# Patient Record
Sex: MALE | Race: BLACK OR AFRICAN AMERICAN | Employment: FULL TIME | URBAN - METROPOLITAN AREA
[De-identification: names, ages, dates, MRNs, and addresses within clinical notes are randomized per-mention and may not be internally consistent; named-entity substitution may affect disease eponyms.]

---

## 2017-01-03 DIAGNOSIS — Z01.812 BLOOD TESTS PRIOR TO TREATMENT OR PROCEDURE: ICD-10-CM

## 2017-01-03 DIAGNOSIS — S83.242A ACUTE MEDIAL MENISCUS TEAR OF LEFT KNEE, INITIAL ENCOUNTER: Primary | ICD-10-CM

## 2017-01-03 DIAGNOSIS — S83.282A ACUTE LATERAL MENISCUS TEAR OF LEFT KNEE, INITIAL ENCOUNTER: ICD-10-CM

## 2017-01-03 DIAGNOSIS — Z01.810 PRE-OPERATIVE CARDIOVASCULAR EXAMINATION: ICD-10-CM

## 2017-01-03 DIAGNOSIS — Z01.811 PRE-OPERATIVE RESPIRATORY EXAMINATION: ICD-10-CM

## 2017-01-04 ENCOUNTER — DOCUMENTATION ONLY (OUTPATIENT)
Dept: ORTHOPEDIC SURGERY | Facility: CLINIC | Age: 42
End: 2017-01-04

## 2017-01-04 ENCOUNTER — HOSPITAL ENCOUNTER (OUTPATIENT)
Dept: GENERAL RADIOLOGY | Age: 42
Discharge: HOME | End: 2017-01-04
Payer: COMMERCIAL

## 2017-01-04 ENCOUNTER — HOSPITAL ENCOUNTER (OUTPATIENT)
Dept: PREADMISSION TESTING | Age: 42
Discharge: HOME | End: 2017-01-04
Payer: COMMERCIAL

## 2017-01-04 DIAGNOSIS — S83.242A ACUTE MEDIAL MENISCUS TEAR OF LEFT KNEE, INITIAL ENCOUNTER: ICD-10-CM

## 2017-01-04 DIAGNOSIS — Z01.812 BLOOD TESTS PRIOR TO TREATMENT OR PROCEDURE: ICD-10-CM

## 2017-01-04 DIAGNOSIS — Z01.811 PRE-OPERATIVE RESPIRATORY EXAMINATION: ICD-10-CM

## 2017-01-04 DIAGNOSIS — S83.282A ACUTE LATERAL MENISCUS TEAR OF LEFT KNEE, INITIAL ENCOUNTER: ICD-10-CM

## 2017-01-04 DIAGNOSIS — Z01.810 PRE-OPERATIVE CARDIOVASCULAR EXAMINATION: ICD-10-CM

## 2017-01-04 LAB
ANION GAP BLD CALC-SCNC: 7 MMOL/L (ref 3–18)
BASOPHILS # BLD AUTO: 0.1 K/UL (ref 0–0.1)
BASOPHILS # BLD: 1 % (ref 0–2)
BUN SERPL-MCNC: 15 MG/DL (ref 7–18)
BUN/CREAT SERPL: 13 (ref 12–20)
CALCIUM SERPL-MCNC: 9 MG/DL (ref 8.5–10.1)
CHLORIDE SERPL-SCNC: 104 MMOL/L (ref 100–108)
CO2 SERPL-SCNC: 28 MMOL/L (ref 21–32)
CREAT SERPL-MCNC: 1.2 MG/DL (ref 0.6–1.3)
DIFFERENTIAL METHOD BLD: ABNORMAL
EOSINOPHIL # BLD: 0.6 K/UL (ref 0–0.4)
EOSINOPHIL NFR BLD: 8 % (ref 0–5)
ERYTHROCYTE [DISTWIDTH] IN BLOOD BY AUTOMATED COUNT: 14 % (ref 11.6–14.5)
GLUCOSE SERPL-MCNC: 76 MG/DL (ref 74–99)
HCT VFR BLD AUTO: 43.4 % (ref 36–48)
HGB BLD-MCNC: 14.6 G/DL (ref 13–16)
LYMPHOCYTES # BLD AUTO: 33 % (ref 21–52)
LYMPHOCYTES # BLD: 2.5 K/UL (ref 0.9–3.6)
MCH RBC QN AUTO: 26.2 PG (ref 24–34)
MCHC RBC AUTO-ENTMCNC: 33.6 G/DL (ref 31–37)
MCV RBC AUTO: 77.9 FL (ref 74–97)
MONOCYTES # BLD: 0.7 K/UL (ref 0.05–1.2)
MONOCYTES NFR BLD AUTO: 9 % (ref 3–10)
NEUTS SEG # BLD: 4 K/UL (ref 1.8–8)
NEUTS SEG NFR BLD AUTO: 49 % (ref 40–73)
PLATELET # BLD AUTO: 203 K/UL (ref 135–420)
PMV BLD AUTO: 11.5 FL (ref 9.2–11.8)
POTASSIUM SERPL-SCNC: 4.2 MMOL/L (ref 3.5–5.5)
RBC # BLD AUTO: 5.57 M/UL (ref 4.7–5.5)
SODIUM SERPL-SCNC: 139 MMOL/L (ref 136–145)
WBC # BLD AUTO: 7.8 K/UL (ref 4.6–13.2)

## 2017-01-04 PROCEDURE — 80048 BASIC METABOLIC PNL TOTAL CA: CPT | Performed by: PHYSICIAN ASSISTANT

## 2017-01-04 PROCEDURE — 93005 ELECTROCARDIOGRAM TRACING: CPT

## 2017-01-04 PROCEDURE — 71020 XR CHEST PA LAT: CPT

## 2017-01-04 PROCEDURE — 85025 COMPLETE CBC W/AUTO DIFF WBC: CPT | Performed by: PHYSICIAN ASSISTANT

## 2017-01-04 PROCEDURE — 36415 COLL VENOUS BLD VENIPUNCTURE: CPT | Performed by: PHYSICIAN ASSISTANT

## 2017-01-04 NOTE — PROGRESS NOTES
Patient came into the HS office and dropped off disability paperwork to be completed by Dr. Ann Arnett. Patient doesn't want his form faxed back. He will pick it up. Patient contact [phone].   Surgery is scheduled with Dr. Ann Arnett on the 1/11/17

## 2017-01-05 LAB
ATRIAL RATE: 56 BPM
CALCULATED P AXIS, ECG09: 46 DEGREES
CALCULATED R AXIS, ECG10: 37 DEGREES
CALCULATED T AXIS, ECG11: 16 DEGREES
DIAGNOSIS, 93000: NORMAL
P-R INTERVAL, ECG05: 184 MS
Q-T INTERVAL, ECG07: 444 MS
QRS DURATION, ECG06: 110 MS
QTC CALCULATION (BEZET), ECG08: 428 MS
VENTRICULAR RATE, ECG03: 56 BPM

## 2017-01-09 ENCOUNTER — OFFICE VISIT (OUTPATIENT)
Dept: ORTHOPEDIC SURGERY | Facility: CLINIC | Age: 42
End: 2017-01-09

## 2017-01-09 VITALS
BODY MASS INDEX: 35.16 KG/M2 | DIASTOLIC BLOOD PRESSURE: 96 MMHG | RESPIRATION RATE: 16 BRPM | HEART RATE: 110 BPM | WEIGHT: 274 LBS | SYSTOLIC BLOOD PRESSURE: 149 MMHG | HEIGHT: 74 IN | TEMPERATURE: 97.9 F

## 2017-01-09 DIAGNOSIS — S83.242D TEAR OF MEDIAL MENISCUS OF LEFT KNEE, CURRENT, UNSPECIFIED TEAR TYPE, SUBSEQUENT ENCOUNTER: Primary | ICD-10-CM

## 2017-01-09 DIAGNOSIS — Z01.818 PREOP GENERAL PHYSICAL EXAM: ICD-10-CM

## 2017-01-09 RX ORDER — PROMETHAZINE HYDROCHLORIDE 25 MG/1
25 TABLET ORAL
Qty: 20 TAB | Refills: 0 | Status: SHIPPED | OUTPATIENT
Start: 2017-01-09 | End: 2018-02-15

## 2017-01-09 RX ORDER — LOSARTAN POTASSIUM AND HYDROCHLOROTHIAZIDE 12.5; 5 MG/1; MG/1
1 TABLET ORAL DAILY
COMMUNITY
End: 2018-02-15

## 2017-01-09 RX ORDER — METOPROLOL TARTRATE 50 MG/1
TABLET ORAL 2 TIMES DAILY
Status: ON HOLD | COMMUNITY
End: 2018-02-15

## 2017-01-09 RX ORDER — OXYCODONE AND ACETAMINOPHEN 5; 325 MG/1; MG/1
1 TABLET ORAL
Qty: 64 TAB | Refills: 0 | Status: SHIPPED | OUTPATIENT
Start: 2017-01-09 | End: 2018-02-15

## 2017-01-09 NOTE — H&P
History and Physical    35 year AA male seen for H and P in preparation for left knee arthroscopy  Review of Systems   Constitutional: Positive for activity change (decreased use left knee secondary to internal derangement). Negative for chills, fatigue and fever. HENT: Negative for ear pain. Eyes: Negative for pain. Respiratory: Negative for shortness of breath and wheezing. Cardiovascular: Negative. Gastrointestinal: Negative for nausea and vomiting. Endocrine: Negative. Genitourinary: Negative for flank pain. Musculoskeletal: Positive for gait problem, joint swelling and myalgias. Left knee   Skin: Negative. Allergic/Immunologic: Negative. Neurological: Positive for weakness (Left Lower extremity). Hematological: Negative. Psychiatric/Behavioral: Negative. Physical Exam   Nursing note and vitals reviewed. Constitutional: He is oriented to person, place, and time. He appears well-developed and well-nourished. HENT:   Head: Normocephalic and atraumatic. Right Ear: External ear normal.   Left Ear: External ear normal.   Nose: Nose normal.   Mouth/Throat: Oropharynx is clear and moist.   Eyes: Conjunctivae and EOM are normal. Pupils are equal, round, and reactive to light. Neck: Normal range of motion. Cardiovascular: Normal rate and regular rhythm. Murmur heard. Systolic murmur is present with a grade of 4/6   Pulmonary/Chest: Effort normal and breath sounds normal.   Musculoskeletal:        Left knee: He exhibits decreased range of motion and effusion. Tenderness found. Medial joint line tenderness noted. Legs:  Neurological: He is alert and oriented to person, place, and time. Skin: Skin is warm and dry. Psychiatric: He has a normal mood and affect.  His behavior is normal. Judgment and thought content normal.     Left knee internal derangement    Pain in left knee secondary to above    Left knee arthroscopy      Risk / Benefit: Surgeon will discuss in \"face to face\" encounter on day of surgery.

## 2017-01-09 NOTE — PROGRESS NOTES
Alejandra Perry returns for History and Physical in preparation of upcoming left knee arthroscopy. He has a history of left knee internal derangement and has failed all conservative measures directed under the care of Dr. Paulo Mendez. Please see the attached forms in Epic for History, Physical, and Review of systems.

## 2017-01-10 ENCOUNTER — DOCUMENTATION ONLY (OUTPATIENT)
Dept: ORTHOPEDIC SURGERY | Facility: CLINIC | Age: 42
End: 2017-01-10

## 2017-01-13 ENCOUNTER — OFFICE VISIT (OUTPATIENT)
Dept: ORTHOPEDIC SURGERY | Facility: CLINIC | Age: 42
End: 2017-01-13

## 2017-01-13 VITALS
WEIGHT: 274 LBS | DIASTOLIC BLOOD PRESSURE: 102 MMHG | SYSTOLIC BLOOD PRESSURE: 154 MMHG | TEMPERATURE: 98 F | BODY MASS INDEX: 35.18 KG/M2 | HEART RATE: 69 BPM

## 2017-01-13 DIAGNOSIS — S83.242D TEAR OF MEDIAL MENISCUS OF LEFT KNEE, CURRENT, UNSPECIFIED TEAR TYPE, SUBSEQUENT ENCOUNTER: Primary | ICD-10-CM

## 2017-01-13 DIAGNOSIS — M25.40 EFFUSION INTO JOINT: ICD-10-CM

## 2017-01-13 RX ORDER — TRIAMCINOLONE ACETONIDE 40 MG/ML
40 INJECTION, SUSPENSION INTRA-ARTICULAR; INTRAMUSCULAR ONCE
Qty: 1 ML | Refills: 0
Start: 2017-01-13 | End: 2017-01-13

## 2017-01-13 RX ORDER — OXYCODONE HYDROCHLORIDE 10 MG/1
10 TABLET ORAL
Qty: 44 TAB | Refills: 0 | Status: SHIPPED | OUTPATIENT
Start: 2017-01-13 | End: 2018-02-15

## 2017-01-13 NOTE — PROGRESS NOTES
HISTORY OF PRESENT ILLNESS:  Adia Gary returns two days status post his left knee arthroscopy with partial medial meniscectomy. He has pain in the left knee today that he rates as an 8/10. He is using crutches for ambulation assistance. He is partial weightbearing of his left lower extremity. Intraoperatively, he was found to have grade one to two changes, chondromalacia of the patella, and grade three to four degenerative changes in the medial femoral condyle and tibial articular surface. PHYSICAL EXAM:  The patient lying supine reveals a 2+ effusion of the left knee, anterior medial and lateral joint portals are intact with interrupted nylon sutures. There is no evidence of wound dehiscence or infection noted. He has minimal range of motion of his left knee today at only 80° - 20°. There is popliteus tenderness but no calf tenderness. PROCEDURE:  Using sterile technique, after verbal and written consent were obtained, 5 cc of 1% Lidocaine was used to anesthetize the left knee using a superior articular approach. There are no complications. To follow, 65 cc of thin, bloody aspirate were removed from the same portal to follow 12 cc of Marcaine 0.25% mixed with 2 ml of Kenalog at 40 mg per ml with no complications. PLAN:  The patient is going to wean himself from his crutches over the next four days. He will follow with our office in a week for wound check and likely suture removal.  His medicine for pain was increased to 10 mg, and he may take one and one-half tablets if necessary for symptom management every six hours.

## 2017-01-13 NOTE — MR AVS SNAPSHOT
Visit Information Date & Time Provider Department Dept. Phone Encounter #  
 1/13/2017  8:45 AM Lauren Herr, [address] [facility] [phone] [id number] Follow-up Instructions Return in about 1 week (around 1/20/2017). Upcoming Health Maintenance Date Due DTaP/Tdap/Td series (1 - Tdap) 9/4/1994 INFLUENZA AGE 9 TO ADULT 8/1/2016 Allergies as of 1/13/2017  Review Complete On: 1/13/2017 By: Lauren Herr PA-C No Known Allergies Current Immunizations  Never Reviewed No immunizations on file. Not reviewed this visit You Were Diagnosed With   
  
 Codes Comments Tear of medial meniscus of left knee, current, unspecified tear type, subsequent encounter    -  Primary ICD-10-CM: L05.143O ICD-9-CM: 836.0 Effusion into joint     ICD-10-CM: M25.40 ICD-9-CM: 719.00 Vitals BP Pulse Temp Weight(growth percentile) BMI Smoking Status (!) 154/102 (BP 1 Location: Left arm, BP Patient Position: Sitting) 69 98 °F (36.7 °C) (Oral) 274 lb (124.3 kg) 35.18 kg/m2 Never Smoker BMI and BSA Data Body Mass Index Body Surface Area  
 35.18 kg/m 2 2.55 m 2 Your Updated Medication List  
  
   
This list is accurate as of: 1/13/17  9:10 AM.  Always use your most recent med list.  
  
  
  
  
 acetaminophen-codeine 300-30 mg per tablet Commonly known as:  TYLENOL-CODEINE #3  
1-2 tabs Qhs prn pain  
  
 albuterol 90 mcg/actuation inhaler Commonly known as:  PROVENTIL HFA, VENTOLIN HFA, PROAIR HFA Take 2 Puffs by inhalation every four (4) hours as needed for Wheezing or Shortness of Breath (or cough). lisinopril 10 mg tablet Commonly known as:  Torre [place] Take  by mouth daily. losartan-hydroCHLOROthiazide 50-12.5 mg per tablet Commonly known as:  HYZAAR Take 1 Tab by mouth daily. metoprolol tartrate 50 mg tablet Commonly known as:  LOPRESSOR  
 Take  by mouth two (2) times a day. oxyCODONE IR 10 mg Tab immediate release tablet Commonly known as:  Matthew Aristides Take 1 Tab by mouth every six (6) hours as needed. Max Daily Amount: 40 mg. Indications: PAIN  
  
 oxyCODONE-acetaminophen 5-325 mg per tablet Commonly known as:  PERCOCET Take 1 Tab by mouth every four (4) hours as needed for Pain. Max Daily Amount: 6 Tabs. Indications: PAIN  
  
 predniSONE 10 mg dose pack Commonly known as:  STERAPRED DS Take 6, 5, 4, 3, 2, and 1 tab po q a.m. On successive days  
  
 promethazine 25 mg tablet Commonly known as:  PHENERGAN Take 1 Tab by mouth every six (6) hours as needed for Nausea. Prescriptions Printed Refills  
 oxyCODONE IR (ROXICODONE) 10 mg tab immediate release tablet 0 Sig: Take 1 Tab by mouth every six (6) hours as needed. Max Daily Amount: 40 mg. Indications: PAIN Class: Print Route: Oral  
  
Follow-up Instructions Return in about 1 week (around 1/20/2017). Introducing \[facility]\"" & HEALTH SERVICES! Deysi Hamilton introduces Odoo (formerly OpenERP) patient portal. Now you can access parts of your medical record, email your doctor's office, and request medication refills online. 1. In your internet browser, go to [email or web address] 2. Click on the First Time User? Click Here link in the Sign In box. You will see the New Member Sign Up page. 3. Enter your Odoo (formerly OpenERP) Access Code exactly as it appears below. You will not need to use this code after youve completed the sign-up process. If you do not sign up before the expiration date, you must request a new code. · Odoo (formerly OpenERP) Access Code: [email or web address] Expires: 4/4/2017  4:26 PM 
 
4. Enter the last four digits of your Social Security Number (xxxx) and Date of Birth (mm/dd/yyyy) as indicated and click Submit. You will be taken to the next sign-up page. 5. Create a Odoo (formerly OpenERP) ID.  This will be your Odoo (formerly OpenERP) login ID and cannot be changed, so think of one that is secure and easy to remember. 6. Create a Thoof password. You can change your password at any time. 7. Enter your Password Reset Question and Answer. This can be used at a later time if you forget your password. 8. Enter your e-mail address. You will receive e-mail notification when new information is available in [address] 9. Click Sign Up. You can now view and download portions of your medical record. 10. Click the Download Summary menu link to download a portable copy of your medical information. If you have questions, please visit the Frequently Asked Questions section of the Thoof website. Remember, Thoof is NOT to be used for urgent needs. For medical emergencies, dial 911. Now available from your iPhone and Android! Please provide this summary of care documentation to your next provider. Your primary care clinician is listed as Pastor Zeng. If you have any questions after today's visit, please call [phone].

## 2017-01-16 DIAGNOSIS — S83.282A ACUTE LATERAL MENISCUS TEAR OF LEFT KNEE, INITIAL ENCOUNTER: ICD-10-CM

## 2017-01-16 DIAGNOSIS — S83.242A ACUTE MEDIAL MENISCUS TEAR OF LEFT KNEE, INITIAL ENCOUNTER: Primary | ICD-10-CM

## 2017-01-19 ENCOUNTER — OFFICE VISIT (OUTPATIENT)
Dept: ORTHOPEDIC SURGERY | Facility: CLINIC | Age: 42
End: 2017-01-19

## 2017-01-19 VITALS
BODY MASS INDEX: 35.18 KG/M2 | TEMPERATURE: 98 F | WEIGHT: 274 LBS | SYSTOLIC BLOOD PRESSURE: 147 MMHG | HEART RATE: 70 BPM | DIASTOLIC BLOOD PRESSURE: 91 MMHG

## 2017-01-19 DIAGNOSIS — S83.242D TEAR OF MEDIAL MENISCUS OF LEFT KNEE, CURRENT, UNSPECIFIED TEAR TYPE, SUBSEQUENT ENCOUNTER: Primary | ICD-10-CM

## 2017-01-19 DIAGNOSIS — M17.12 PRIMARY OSTEOARTHRITIS OF LEFT KNEE: ICD-10-CM

## 2017-01-19 NOTE — PROGRESS NOTES
HISTORY: Mr. Devang Juarez returns. He is status post his left knee arthroscopy. He is here for wound check and suture removal. He is full weightbearing on his left lower extremity. He has not started physical therapy to date. PHYSICAL EXAMINATION: The left knee over the anterior surface, medial and lateral joint portals associated, reveals interrupted nylon sutures present spanning healing surgical sites. Surgical sites are dry. No evidence of wound dehiscence or infection. Active motion is limited secondary to guarding and pain with patella tracking midline 100 degrees minus 5 degrees today. No calf tenderness or evidence of DVT. He did bend his knee slightly crossing his legs a couple nights ago and had pain develop on the inside of the left knee. He has history of noted degenerative change found intraoperatively of the medial joint space. PROCEDURE: Using clean technique, all sutures were removed today with no complications. Patient had a sterile dressing placed. PLAN: He will follow-up with our office in about three weeks. He will remain out of work until then. He is to be full weightbearing on the left lower extremity and will begin physical therapy status post left knee arthroscopy with post-knee arthroscopy protocol. Today all of his and his wife's questions were answered to their satisfaction.

## 2017-01-19 NOTE — MR AVS SNAPSHOT
Visit Information Date & Time Provider Department Dept. Phone Encounter #  
 1/19/2017  2:30 PM Ana Muller, [address] [facility] [phone] [id number] Your Appointments 1/19/2017  2:30 PM  
Follow Up with Aan Muller PA-C  
[facility] [address] [place] [address]) Appt Note: FU LT KNEE  
 [address] [facility], [address] [facility] [id number] [phone]  
  
   
 [address] [facility], [address] [place] [address] Upcoming Health Maintenance Date Due DTaP/Tdap/Td series (1 - Tdap) 9/4/1994 INFLUENZA AGE 9 TO ADULT 8/1/2016 Allergies as of 1/19/2017  Review Complete On: 1/19/2017 By: Isabel Paulson No Known Allergies Current Immunizations  Never Reviewed No immunizations on file. Not reviewed this visit You Were Diagnosed With   
  
 Codes Comments Tear of medial meniscus of left knee, current, unspecified tear type, subsequent encounter    -  Primary ICD-10-CM: T27.140K ICD-9-CM: 836.0 Primary osteoarthritis of left knee     ICD-10-CM: M17.12 
ICD-9-CM: 715.16 Vitals BP Pulse Temp Weight(growth percentile) BMI Smoking Status (!) 147/91 (BP 1 Location: Left arm, BP Patient Position: Sitting) 70 98 °F (36.7 °C) (Oral) 274 lb (124.3 kg) 35.18 kg/m2 Never Smoker Vitals History BMI and BSA Data Body Mass Index Body Surface Area  
 35.18 kg/m 2 2.55 m 2 Your Updated Medication List  
  
   
This list is accurate as of: 1/19/17  2:27 PM.  Always use your most recent med list.  
  
  
  
  
 acetaminophen-codeine 300-30 mg per tablet Commonly known as:  TYLENOL-CODEINE #3  
1-2 tabs Qhs prn pain  
  
 albuterol 90 mcg/actuation inhaler Commonly known as:  PROVENTIL HFA, VENTOLIN HFA, PROAIR HFA Take 2 Puffs by inhalation every four (4) hours as needed for Wheezing or Shortness of Breath (or cough). lisinopril 10 mg tablet Commonly known as:  Kelin Hu Take  by mouth daily. losartan-hydroCHLOROthiazide 50-12.5 mg per tablet Commonly known as:  HYZAAR Take 1 Tab by mouth daily. metoprolol tartrate 50 mg tablet Commonly known as:  LOPRESSOR Take  by mouth two (2) times a day. oxyCODONE IR 10 mg Tab immediate release tablet Commonly known as:  Dorthea Mccreedy Take 1 Tab by mouth every six (6) hours as needed. Max Daily Amount: 40 mg. Indications: PAIN  
  
 oxyCODONE-acetaminophen 5-325 mg per tablet Commonly known as:  PERCOCET Take 1 Tab by mouth every four (4) hours as needed for Pain. Max Daily Amount: 6 Tabs. Indications: PAIN  
  
 predniSONE 10 mg dose pack Commonly known as:  STERAPRED DS Take 6, 5, 4, 3, 2, and 1 tab po q a.m. On successive days  
  
 promethazine 25 mg tablet Commonly known as:  PHENERGAN Take 1 Tab by mouth every six (6) hours as needed for Nausea. We Performed the Following REFERRAL TO PHYSICAL THERAPY [VGM60 Custom] Comments:  
 Left knee arthroscopy protocol 2-3 times a week for 4-6 weeks Referral Information Referral ID Referred By Referred To  
  
 [id number] [facility] PHYSICAL THERAPY   
   [address], [address] [place] [address] Phone: [phone] Fax: [phone] Visits Status Start Date End Date 1 New Request 1/19/17 1/19/18 If your referral has a status of pending review or denied, additional information will be sent to support the outcome of this decision. Introducing [facility] & HEALTH SERVICES! [facility] introduces That{img} patient portal. Now you can access parts of your medical record, email your doctor's office, and request medication refills online. 1. In your internet browser, go to [email or web address] 2. Click on the First Time User? Click Here link in the Sign In box. You will see the New Member Sign Up page. 3. Enter your Flyfit Access Code exactly as it appears below. You will not need to use this code after youve completed the sign-up process. If you do not sign up before the expiration date, you must request a new code. · Flyfit Access Code: [email or web address] Expires: 4/4/2017  4:26 PM 
 
4. Enter the last four digits of your Social Security Number (xxxx) and Date of Birth (mm/dd/yyyy) as indicated and click Submit. You will be taken to the next sign-up page. 5. Create a Flyfit ID. This will be your Flyfit login ID and cannot be changed, so think of one that is secure and easy to remember. 6. Create a Flyfit password. You can change your password at any time. 7. Enter your Password Reset Question and Answer. This can be used at a later time if you forget your password. 8. Enter your e-mail address. You will receive e-mail notification when new information is available in [address] 9. Click Sign Up. You can now view and download portions of your medical record. 10. Click the Download Summary menu link to download a portable copy of your medical information. If you have questions, please visit the Frequently Asked Questions section of the Flyfit website. Remember, Flyfit is NOT to be used for urgent needs. For medical emergencies, dial 911. Now available from your iPhone and Android! Please provide this summary of care documentation to your next provider. Your primary care clinician is listed as Sharon Nam. If you have any questions after today's visit, please call [phone].

## 2017-01-23 ENCOUNTER — HOSPITAL ENCOUNTER (OUTPATIENT)
Dept: PHYSICAL THERAPY | Age: 42
Discharge: HOME | End: 2017-01-23
Payer: COMMERCIAL

## 2017-01-23 PROCEDURE — 97110 THERAPEUTIC EXERCISES: CPT

## 2017-01-23 PROCEDURE — 97162 PT EVAL MOD COMPLEX 30 MIN: CPT

## 2017-01-23 NOTE — PROGRESS NOTES
[facility] [employer] [facility]  [address] [place] [address]  [phone] fax    Plan of Care/ Statement of Necessity for Physical Therapy Services    Patient name: Dionne Núñez Start of Care: 2017   Referral source: Tereza Cody MD : 1975    Medical Diagnosis: Unilateral primary osteoarthritis, left knee [M17.12]  Other tear of medial meniscus, current injury, left knee, subsequent encounter [S83.837I]   Onset Date:17    Treatment Diagnosis: L knee arthroscopy    Prior Hospitalization: see medical history Provider#: [id number]   Medications: Verified on Patient summary List    Comorbidities: HTN   Prior Level of Function: lives with wife and 2 children in a 1 story home with 4 steps to enter and 1 HR, dancing, basketball, walking, yard work, works maintenance (climbing ladders, 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 Assessment/ key information: Pt is a 36 yo M who presents s/p L knee arthroscopy 17 d/t failed conservative treatment for meniscal tear after dancing with his wife. Subjective reports of pain increased with walking > 0.5 miles and bending and relieved with rest.  Pt is TTP over patella, medial joint line, and fibular head on L. He demonstrates decreased glute strength B L > R, likely contributing to valgus posture, medial displacement of L patella, and intermittent pain along ITB. L knee strength is mildly decreased (ext 4+/5, flex 4/5) and ROM is WNL. Pt will benefit from skilled PT to address strength, flexibility, and postural deficits in order to perform work tasks and return to OF.     Evaluation Complexity History MEDIUM  Complexity : 1-2 comorbidities / personal factors will impact the outcome/ POC ; Examination MEDIUM Complexity : 3 Standardized tests and measures addressing body structure, function, activity limitation and / or participation in recreation  ;Presentation MEDIUM Complexity : Evolving with changing characteristics  ; Clinical Decision Making MEDIUM Complexity : FOTO score of 26-74  Overall Complexity Rating: MEDIUM  Problem List: pain affecting function, decrease ROM, decrease strength, impaired gait/ balance, decrease ADL/ functional abilitiies, decrease activity tolerance and decrease flexibility/ joint mobility   Treatment Plan may include any combination of the following: Therapeutic exercise, Therapeutic activities, Neuromuscular re-education, Physical agent/modality, Gait/balance training, Manual therapy, Patient education, Self Care training, Functional mobility training, Home safety training and Stair training  Patient / Family readiness to learn indicated by: asking questions, trying to perform skills and interest  Persons(s) to be included in education: patient (P) and family support person (FSP);list wife  Barriers to Learning/Limitations: None  Patient Goal (s): to get mobility back  Patient Self Reported Health Status: good  Rehabilitation Potential: good    Short Term Goals: To be accomplished in 1 weeks:  1. Pt will be compliant with initial HEP  Long Term Goals: To be accomplished in 6 weeks:  1. Pt will improve FOTO by 3 points in order to demonstrate functional improvement   2. Pt will improve L knee strength to 5/5 in order to perform work tasks of carrying/lifting  3. Pt will lift >25# floor to waist (after 5 weeks post-op) without increased sx and proper form in order to progress towards lifting with work tasks. 4. Pt will improve B hip ext strength to >4/5 in order to improve kinetic chain alignment with work tasks  5. Pt will report <1/10 average pain in order to improve QOL   Frequency / Duration: Patient to be seen 2-3 times per week for 6 weeks.     Patient/ CarPatient/ Caregiver education and instruction: Diagnosis, prognosis, activity modification and exercises   [x]  Plan of care has been reviewed with LILLIAN Mahajan, PT 1/23/2017 3:42 PM    ________________________________________________________________________    I certify that the above Therapy Services are being furnished while the patient is under my care. I agree with the treatment plan and certify that this therapy is necessary.     Physician's Signature:____________________  Date:____________Time: _________    Please sign and return to [facility] [address]  [phone] fax

## 2017-01-23 NOTE — PROGRESS NOTES
PT DAILY TREATMENT NOTE/KNEE EVAL 3-16    Patient Name: Foster Finch  Date:2017  : 1975  [x]  Patient  Verified  Payor: BLUE CROSS / Plan: Paola Lin [id number] PPO / Product Type: PPO /    In time:3:42  Out time:4:20  Total Treatment Time (min): 38  Visit #: 1     Treatment Area: Unilateral primary osteoarthritis, left knee [M17.12]  Other tear of medial meniscus, current injury, left knee, subsequent encounter [U70.369A]    SUBJECTIVE  Pain Level (0-10 scale): 2/10  []constant [x]intermittent [x]improving []worsening []no change since onset    Any medication changes, allergies to medications, adverse drug reactions, diagnosis change, or new procedure performed?: [x] No    [] Yes (see summary sheet for update)  Subjective functional status/changes:     S/p L knee arthroplasty 17 after injuring it while dancing with his wife. He has had high BP lately and sees his PCP tomorrow regarding his BP. He noticed calf pain for the first time this morning while walking. Pain is only while walking.       Aggravating factors: walking > 0.5 miles, bending  Relieving factors:rest    Barriers: []pain []financial []time []transportation [x]other none  Motivation: high  Substance use: []Alcohol []Tobacco []other:   FABQ Score: [x]low []elevate - based on FOTO  Cognition: A & O x 4    Other:    OBJECTIVE/EXAMINATION        23 min [x]Eval                  []Re-Eval       10 min Therapeutic Exercise:  [] See flow sheet : See HEP   Rationale: increase ROM and increase strength to improve the patients ability to improve ease of ambulation and daily tasks    5 min Therapeutic Activity:  []  See flow sheet :   Rationale: Educated patient regarding signs/sx of infection and DVT, call MD/go to the ER if signs/sx, instructed to hold HEP until BP lowered, ice use 10-15 minutes over L knee and R hip, heat use 10-15 minutes L ITB, findings of evaluation, purpose of therapy, and therapy plan in order to ensure pt understanding/compliance with therapy             With   [] TE   [] TA   [] neuro   [] other: Patient Education: [x] Review HEP    [] Progressed/Changed HEP based on:   [] positioning   [] body mechanics   [] transfers   [] heat/ice application    [] other:      Other Objective/Functional Measures:     138/105    Physical Therapy Evaluation - Knee    Posture: [] Varus    [x] Valgus    [] Recurvatum        [] Tibial Torsion    [] Foot Supination    [] Foot Pronation    Describe:    Gait:  [] Normal    [] Abnormal    [x] Antalgic    [] NWB    Device:    Describe: decreased weight shift to L, decreased heel strike/slightly early toe off     ROM / Strength  [] Unable to assess                  AROM                      PROM                   Strength (1-5)    Left Right Left Right Left Right   Hip Flexion     4+ 5    Extension     3+ 4-    Abduction     4+ 4+    Adduction         Knee Flexion 130 126 132 130 4 5    Extension +3 +5 +5 +6 4+ 5   Ankle Plantarflexion     4+ 4+    Dorsiflexion     4+ 5     PF measured in sitting     Flexibility: [] Unable to assess at this time  Hamstrings:    (L) Tightness= [x] WNL   [] Min   [] Mod   [] Severe    (R) Tightness= [x] WNL   [] Min   [] Mod   [] Severe  Quadriceps:    (L) Tightness= [x] WNL   [] Min   [] Mod   [] Severe    (R) Tightness= [x] WNL   [] Min   [] Mod   [] Severe  Other: ITB: min decreased on L    Palpation:   Neg/Pos  Neg/Pos  Neg/Pos   Joint Line pos medial Quad tendon neg Patellar ligament neg   Patella pos Fibular head pos Pes Anserinus neg   Tibial tubercle neg Hamstring tendons neg Infrapatellar fat pad Neg      Optional Tests:  Patellar Positioning (Static)   []L []R Normal []L []R Lateral   []L []R Nader Moll      [x]L []R Medial   []L []R [place]    Patellar Mobility   WNL B       Girth Measurements:     Cm at  5 cm below tibial tuberosity    Cm at   Cm below joint line  Cm at joint line   Left  40.6 cm      Right   41.5cm                   Other tests/comments:    /105 taken manually following therapy  Completed wall squats to 90 dgrs without pain  Instructed to perform ITB stretch in pain-free range  Slight R hip pain with SL bridges on L that subsided immediately when lowered    TTP over fibular head and pt reports that is where calf pain is located  No TTP gastroc/soleus muscle belly  No redness, erythema, increased temp, pitting edema, or ropey veins   No increased pain with evaluation          Pain Level (0-10 scale) post treatment: 0/10    ASSESSMENT/Changes in Function: See POC    Patient will continue to benefit from skilled PT services to modify and progress therapeutic interventions, address functional mobility deficits, address ROM deficits, address strength deficits, analyze and address soft tissue restrictions, analyze and cue movement patterns, analyze and modify body mechanics/ergonomics, assess and modify postural abnormalities, address imbalance/dizziness and instruct in home and community integration to attain remaining goals.      [x]  See Plan of Care  []  See progress note/recertification  []  See Discharge Summary         Progress towards goals / Updated goals:  See POC    PLAN  [x]  Upgrade activities as tolerated     []  Continue plan of care  [x]  Update interventions per flow sheet       []  Discharge due to:_  []  Other:_      Elliot Corcoran, PT 1/23/2017  3:42 PM

## 2017-01-27 ENCOUNTER — HOSPITAL ENCOUNTER (OUTPATIENT)
Dept: PHYSICAL THERAPY | Age: 42
Discharge: HOME | End: 2017-01-27
Payer: COMMERCIAL

## 2017-01-27 PROCEDURE — 97110 THERAPEUTIC EXERCISES: CPT

## 2017-01-27 PROCEDURE — 97016 VASOPNEUMATIC DEVICE THERAPY: CPT

## 2017-01-27 NOTE — PROGRESS NOTES
PT DAILY TREATMENT NOTE     Patient Name: Meryle Gum  Date:2017  : 1975  [x]  Patient  Verified  Payor: BLUE CROSS / Plan: Paola Lin [id number] PPO / Product Type: PPO /    In time:1200  Out time:1245  Total Treatment Time (min): 45  Visit #: 2 of     Treatment Area: Unilateral primary osteoarthritis, left knee [M17.12]  Other tear of medial meniscus, current injury, left knee, subsequent encounter [H48.605W]    SUBJECTIVE  Pain Level (0-10 scale): 0/10  Any medication changes, allergies to medications, adverse drug reactions, diagnosis change, or new procedure performed?: [x] No    [] Yes (see summary sheet for update)  Subjective functional status/changes:   [] No changes reported  Pt reports not having any pain today. Pt's scab came off anterior L knee.      OBJECTIVE    Modality rationale: decrease edema, decrease inflammation and decrease pain to improve the patients ability to increase ease with ambulation and standing activities    Min Type Additional Details    [] Estim:  []Unatt       []IFC  []Premod                        []Other:  []w/ice   []w/heat  Position:  Location:    [] Estim: []Att    []TENS instruct  []NMES                    []Other:  []w/US   []w/ice   []w/heat  Position:  Location:    []  Traction: [] Cervical       []Lumbar                       [] Prone          []Supine                       []Intermittent   []Continuous Lbs:  [] before manual  [] after manual    []  Ultrasound: []Continuous   [] Pulsed                           []1MHz   []3MHz W/cm2:  Location:    []  Iontophoresis with dexamethasone         Location: [] Take home patch   [] In clinic    []  Ice     []  heat  []  Ice massage  []  Laser   []  Anodyne Position:  Location:    []  Laser with stim  []  Other:  Position:  Location:   15 [x]  Vasopneumatic Device Pressure:       [x] lo [] med [] hi   Temperature: [x] lo [] med [] hi   [x] Skin assessment post-treatment:  [x]intact []redness- no adverse reaction    []redness  adverse reaction:     30 min Therapeutic Exercise:  [x] See flow sheet :   Rationale: increase ROM, increase strength, improve coordination, improve balance and increase proprioception to improve the patients ability to perform functional activities in the home and community setting           With   [x] TE   [] TA   [] neuro   [] other: Patient Education: [x] Review HEP    [] Progressed/Changed HEP based on:   [] positioning   [] body mechanics   [] transfers   [] heat/ice application    [] other:      Other Objective/Functional Measures:   BP - 141/104 mmHg, BP - 129/97 mmHg (after 3 minutes rest)  Pt's scab has come off, Slight redness but no infection noticed, Pt educated on local infection and to put bandaid on it to cover  Tactile cueing for SLRx4 to avoid compensations, Abduction required additional cueing  Increased to level 2 clams due to no fatigue noted during LV1  Arm overhead during SB bridges to get more core and glute activation   30# during double limb knee extension machine  40# during double limb knee flexion machine (HS)  40# SL leg press machine   Easy appearance during wall squats, Added RTB to knee to get ER and glute activation, Had wobbly knees  SLS on L for about 10 seconds at a time before LOB  Able to perform hip x3 with occasional LOB without UE assist   Had to place band around knees for side stepping     Pain Level (0-10 scale) post treatment: 0/10    ASSESSMENT/Changes in Function: Pt tolerated treatment session very well today without increase in pain with any exercise. Pt was challenged with machines and glute strengthening exercises. Pt was able to accept all challenges and finish all repetitions but fatigued and was shaking at end of sets. Pt is progressing well and can be progressed to adding weight with table exercises. Continue POC.      Patient will continue to benefit from skilled PT services to modify and progress therapeutic interventions, address functional mobility deficits, address strength deficits, analyze and address soft tissue restrictions, analyze and cue movement patterns, analyze and modify body mechanics/ergonomics, address imbalance/dizziness and instruct in home and community integration to attain remaining goals. [x]  See Plan of Care  []  See progress note/recertification  []  See Discharge Summary         Progress towards goals / Updated goals:  Short Term Goals: To be accomplished in 1 weeks:  1. Pt will be compliant with initial HEP Met 1/27/17 Reports compliance  Long Term Goals: To be accomplished in 6 weeks:  1. Pt will improve FOTO by 3 points in order to demonstrate functional improvement   2. Pt will improve L knee strength to 5/5 in order to perform work tasks of carrying/lifting  3. Pt will lift >25# floor to waist (after 5 weeks post-op) without increased sx and proper form in order to progress towards lifting with work tasks. 4. Pt will improve B hip ext strength to >4/5 in order to improve kinetic chain alignment with work tasks  5.  Pt will report <1/10 average pain in order to improve QOL     PLAN  []  Upgrade activities as tolerated     [x]  Continue plan of care  []  Update interventions per flow sheet       []  Discharge due to:_  []  Other:_      Edison Contreras 1/27/2017  9:35 AM    Future Appointments  Date Time Provider Luisa Bacon   1/27/2017 12:00 PM Cheryl CELESTIN [facility]   1/30/2017 5:00 PM Cheryl Gann MMCPTPB [facility]   2/1/2017 5:30 PM Alvenia Humnoke, PTA MMCPTPB [facility]   2/3/2017 3:30 PM Cherylbarrett Da Silva ULQOGMZ [facility]   2/6/2017 5:30 PM Cherylbarrett Da Silva MYPZOYX [facility]   2/8/2017 5:30 PM Cherylbarrett Gann PZJWAAN [facility]   2/10/2017 5:30 PM Alvenia Humnoke, PTA MMCPTPB [facility]   2/13/2017 5:30 PM Alvenia Humnoke, PTA MMCPTPB [facility]   2/15/2017 5:30 PM Cheryl Gann MMCPTPB [facility]   2/17/2017 5:00 PM Cheryl Gann UIQJINU [facility]   2/20/2017 5:30 PM Cheryl Gann MMCPTPB [facility]   2/22/2017 5:30 PM Seb Cee LTYPPWD [facility]   2/24/2017 5:30 PM Antonella Gonzalez, PTA MMCPTPB [facility]

## 2017-01-30 ENCOUNTER — HOSPITAL ENCOUNTER (OUTPATIENT)
Dept: PHYSICAL THERAPY | Age: 42
Discharge: HOME | End: 2017-01-30
Payer: COMMERCIAL

## 2017-01-30 PROCEDURE — 97110 THERAPEUTIC EXERCISES: CPT

## 2017-01-30 PROCEDURE — 97016 VASOPNEUMATIC DEVICE THERAPY: CPT

## 2017-01-30 NOTE — PROGRESS NOTES
PT DAILY TREATMENT NOTE     Patient Name: Pema Jeff  Date:2017  : 1975  [x]  Patient  Verified  Payor: BLUE CROSS / Plan: Paola Lin [id number] PPO / Product Type: PPO /    In time:500  Out time:545  Total Treatment Time (min): 45  Visit #: 3 of     Treatment Area: Unilateral primary osteoarthritis, left knee [M17.12]  Other tear of medial meniscus, current injury, left knee, subsequent encounter [K86.542M]    SUBJECTIVE  Pain Level (0-10 scale): 0/10  Any medication changes, allergies to medications, adverse drug reactions, diagnosis change, or new procedure performed?: [x] No    [] Yes (see summary sheet for update)  Subjective functional status/changes:   [] No changes reported  Pt reports having no soreness or pain following last session. Pt does continue to have this knot along anterior patella on L which is annoying to him and wants it looked at.      OBJECTIVE    Modality rationale: decrease edema, decrease inflammation and decrease pain to improve the patients ability to increase ease with ambulation and standing activities    Min Type Additional Details    [] Estim:  []Unatt       []IFC  []Premod                        []Other:  []w/ice   []w/heat  Position:  Location:    [] Estim: []Att    []TENS instruct  []NMES                    []Other:  []w/US   []w/ice   []w/heat  Position:  Location:    []  Traction: [] Cervical       []Lumbar                       [] Prone          []Supine                       []Intermittent   []Continuous Lbs:  [] before manual  [] after manual    []  Ultrasound: []Continuous   [] Pulsed                           []1MHz   []3MHz W/cm2:  Location:    []  Iontophoresis with dexamethasone         Location: [] Take home patch   [] In clinic    []  Ice     []  heat  []  Ice massage  []  Laser   []  Anodyne Position:  Location:    []  Laser with stim  []  Other:  Position:  Location:   15 [x]  Vasopneumatic Device Pressure:       [x] lo [] med [] hi Temperature: [x] lo [] med [] hi   [x] Skin assessment post-treatment:  [x]intact []redness- no adverse reaction    []redness  adverse reaction:     30 min Therapeutic Exercise:  [x] See flow sheet :   Rationale: increase ROM, increase strength, improve coordination, improve balance and increase proprioception to improve the patients ability to perform functional activities in the home and community setting           With   [x] TE   [] TA   [] neuro   [] other: Patient Education: [x] Review HEP    [] Progressed/Changed HEP based on:   [] positioning   [] body mechanics   [] transfers   [] heat/ice application    [] other:      Other Objective/Functional Measures:   BP - 137/93 mmHg, 65 bpm  Increased to 50# during leg press  Held 5 seconds at bottom for wall squats with band around knees   Added RTB and ball between feet to increase difficulty with clams   Required assistance to perform SL hip abduction, fatigued quickly and had some TFL compensations  Rebounder in forward direction - 15 times, occasional LOB  Ball toss medial and lateral direction during SLS on L LE, Occasional LOB but fair balance  Able to do hipx3 without UE assist in all directions without LOB     Pain Level (0-10 scale) post treatment: 0/10    ASSESSMENT/Changes in Function: Pt tolerated treatment session very well today with slight improvements in weight during LE strengthening. Pt is progressing very well with therapy and wants to return to gym setting for fitness and toning. Pt has not expressed any increase in pain during any exercises but does fatigue with advanced hip and leg strengthening. Continue POC.      Patient will continue to benefit from skilled PT services to modify and progress therapeutic interventions, address functional mobility deficits, address ROM deficits, address strength deficits, analyze and address soft tissue restrictions, analyze and cue movement patterns, analyze and modify body mechanics/ergonomics, assess and modify postural abnormalities, address imbalance/dizziness and instruct in home and community integration to attain remaining goals. [x]  See Plan of Care  []  See progress note/recertification  []  See Discharge Summary         Progress towards goals / Updated goals:  Short Term Goals: To be accomplished in 1 weeks:  1. Pt will be compliant with initial HEP Met 1/27/17 Reports compliance  Long Term Goals: To be accomplished in 6 weeks:  1. Pt will improve FOTO by 3 points in order to demonstrate functional improvement   2. Pt will improve L knee strength to 5/5 in order to perform work tasks of carrying/lifting  3. Pt will lift >25# floor to waist (after 5 weeks post-op) without increased sx and proper form in order to progress towards lifting with work tasks. 4. Pt will improve B hip ext strength to >4/5 in order to improve kinetic chain alignment with work tasks  5.  Pt will report <1/10 average pain in order to improve QOL     PLAN  []  Upgrade activities as tolerated     [x]  Continue plan of care  []  Update interventions per flow sheet       []  Discharge due to:_  []  Other:_      Medina Jha 1/30/2017  2:05 PM    Future Appointments  Date Time Provider Luisa Bacon   1/30/2017 5:00 PM Brian Gann ATASASB [facility]   2/1/2017 5:30 PM Rosey Barry PTA MMCPTPB [facility]   2/3/2017 3:30 PM Cheryl Gann RGSRDAE [facility]   2/6/2017 5:30 PM Cheryl James LCSAVLS [facility]   2/8/2017 5:30 PM Cheryl Gann MMCPTPB [facility]   2/10/2017 5:30 PM Rosey Barry PTA MMCPTPB [facility]   2/13/2017 5:30 PM Rosey Barry PTA MMCPTPB [facility]   2/15/2017 5:30 PM Cheryl James EATGRLE [facility]   2/17/2017 5:00 PM Brian Allen Delgadopaul NVYYQBS [facility]   2/20/2017 5:30 PM Medina Jha RUOODNM [facility]   2/22/2017 5:30 PM Medina Jha QTROWGA [facility]   2/24/2017 5:30 PM Rosey Barry PTA MMCPTPB [facility]

## 2017-02-01 ENCOUNTER — APPOINTMENT (OUTPATIENT)
Dept: PHYSICAL THERAPY | Age: 42
End: 2017-02-01
Payer: COMMERCIAL

## 2017-02-03 ENCOUNTER — APPOINTMENT (OUTPATIENT)
Dept: PHYSICAL THERAPY | Age: 42
End: 2017-02-03
Payer: COMMERCIAL

## 2017-02-06 ENCOUNTER — HOSPITAL ENCOUNTER (OUTPATIENT)
Dept: PHYSICAL THERAPY | Age: 42
Discharge: HOME | End: 2017-02-06
Payer: COMMERCIAL

## 2017-02-06 PROCEDURE — 97110 THERAPEUTIC EXERCISES: CPT

## 2017-02-06 NOTE — PROGRESS NOTES
PT DAILY TREATMENT NOTE 3-16    Patient Name: Nicolle Velaet  Date:2017  : 1975  [x]  Patient  Verified  Payor: BLUE CROSS / Plan: Paola Lin [id number] PPO / Product Type: PPO /    In time:5:19  Out time:6:09  Total Treatment Time (min): 50  Visit #: 4 of     Treatment Area: Unilateral primary osteoarthritis, left knee [M17.12]  Other tear of medial meniscus, current injury, left knee, subsequent encounter [X71.660R]    SUBJECTIVE  Pain Level (0-10 scale): 0  Any medication changes, allergies to medications, adverse drug reactions, diagnosis change, or new procedure performed?: [x] No    [] Yes (see summary sheet for update)  Subjective functional status/changes:   [x] No changes reported  Patient asks, \"can I do squats at my gym? \" PTA states she will consult with PT before allowing patient to do squats by himself at personal gym. OBJECTIVE    50 min Therapeutic Exercise:  [x] See flow sheet :   Rationale: increase ROM, increase strength and improve coordination to improve the patients ability to perform functional tasks           With   [] TE   [] TA   [] neuro   [] other: Patient Education: [x] Review HEP    [] Progressed/Changed HEP based on:   [] positioning   [] body mechanics   [] transfers   [] heat/ice application    [] other:      Other Objective/Functional Measures: Patient progressing towards LTG to improve L knee strength to 5/5 in order to perform work tasks of carrying/lifting, as demonstrated by 5/5 for L quadricep      Pain Level (0-10 scale) post treatment: 0    ASSESSMENT/Changes in Function: Patient is progressing well as demonstrated by 5/5 for quadricep MMT on L knee today. Patient is able to increase SL bridge reps and is able to add step ups fwd/lateral with cues for eccentric control, patient is able to correct with cues. Per patient request to perform squats at personal gym, PTA instructs patient to perform mini squat, patient requires cues for correct form. Will consult with PT prior to allowing patient to perform mini squats at gym. Patient will continue to benefit from skilled PT services to modify and progress therapeutic interventions, address functional mobility deficits, address ROM deficits, address strength deficits, analyze and address soft tissue restrictions, analyze and cue movement patterns, analyze and modify body mechanics/ergonomics and assess and modify postural abnormalities to attain remaining goals. []  See Plan of Care  []  See progress note/recertification  []  See Discharge Summary         Progress towards goals / Updated goals:  Short Term Goals: To be accomplished in 1 weeks:  1. Pt will be compliant with initial HEP Met 1/27/17 Reports compliance  Long Term Goals: To be accomplished in 6 weeks:  1. Pt will improve FOTO by 3 points in order to demonstrate functional improvement   2. Pt will improve L knee strength to 5/5 in order to perform work tasks of carrying/lifting- PROGRESSING 2/7/2017, L quadricep is 5/5  3. Pt will lift >25# floor to waist (after 5 weeks post-op) without increased sx and proper form in order to progress towards lifting with work tasks. 4. Pt will improve B hip ext strength to >4/5 in order to improve kinetic chain alignment with work tasks  5.  Pt will report <1/10 average pain in order to improve QOL     PLAN  [x]  Upgrade activities as tolerated     [x]  Continue plan of care  []  Update interventions per flow sheet       []  Discharge due to:_  []  Other:_      Trevon Johnson 2/6/2017  5:46 PM    Future Appointments  Date Time Provider Luisa Bacon   2/8/2017 5:30 PM Trevon Johnson [id number] [facility]   2/10/2017 5:30 PM Tori Baron PTA MMCPTPB [facility]   2/13/2017 5:30 PM Tori Baron PTA MMCPTPB [facility]   2/15/2017 5:30 PM Elvia Gann MMCPTPB [facility]   2/17/2017 5:00 PM Elvia Gann ZGEKSVR [facility]   2/20/2017 5:30 PM Elvia Gann MMCPTPB [facility]   2/22/2017 5:30 PM Cheryl Gann MMCPTPB [facility] 2/24/2017 5:30 PM Rohan Velasco PTA MMCPTPB [facility]

## 2017-02-08 ENCOUNTER — APPOINTMENT (OUTPATIENT)
Dept: PHYSICAL THERAPY | Age: 42
End: 2017-02-08
Payer: COMMERCIAL

## 2017-02-10 ENCOUNTER — APPOINTMENT (OUTPATIENT)
Dept: PHYSICAL THERAPY | Age: 42
End: 2017-02-10
Payer: COMMERCIAL

## 2017-02-17 ENCOUNTER — HOSPITAL ENCOUNTER (OUTPATIENT)
Dept: PHYSICAL THERAPY | Age: 42
End: 2017-02-17
Payer: COMMERCIAL

## 2017-02-17 ENCOUNTER — OFFICE VISIT (OUTPATIENT)
Dept: ORTHOPEDIC SURGERY | Facility: CLINIC | Age: 42
End: 2017-02-17

## 2017-02-17 VITALS
BODY MASS INDEX: 35.82 KG/M2 | DIASTOLIC BLOOD PRESSURE: 105 MMHG | HEART RATE: 70 BPM | TEMPERATURE: 98 F | SYSTOLIC BLOOD PRESSURE: 158 MMHG | WEIGHT: 279 LBS

## 2017-02-17 DIAGNOSIS — Z98.890 S/P ARTHROSCOPY OF LEFT KNEE: ICD-10-CM

## 2017-02-17 DIAGNOSIS — S83.242D TEAR OF MEDIAL MENISCUS OF LEFT KNEE, CURRENT, UNSPECIFIED TEAR TYPE, SUBSEQUENT ENCOUNTER: Primary | ICD-10-CM

## 2017-02-17 NOTE — MR AVS SNAPSHOT
Visit Information Date & Time Provider Department Dept. Phone Encounter #  
 2/17/2017  1:15 PM Satya Bae, [address] [facility] - [place] [phone] Upcoming Health Maintenance Date Due DTaP/Tdap/Td series (1 - Tdap) 9/4/1994 INFLUENZA AGE 9 TO ADULT 8/1/2016 Allergies as of 2/17/2017  Review Complete On: 2/17/2017 By: Michell Mills No Known Allergies Current Immunizations  Never Reviewed No immunizations on file. Not reviewed this visit Vitals BP Pulse Temp Weight(growth percentile) BMI Smoking Status (!) 158/105 (BP 1 Location: Left arm, BP Patient Position: Sitting) 70 98 °F (36.7 °C) (Oral) 279 lb (126.6 kg) 35.82 kg/m2 Never Smoker Vitals History BMI and BSA Data Body Mass Index Body Surface Area  
 35.82 kg/m 2 2.57 m 2 Your Updated Medication List  
  
   
This list is accurate as of: 2/17/17  1:29 PM.  Always use your most recent med list.  
  
  
  
  
 acetaminophen-codeine 300-30 mg per tablet Commonly known as:  TYLENOL-CODEINE #3  
1-2 tabs Qhs prn pain  
  
 albuterol 90 mcg/actuation inhaler Commonly known as:  PROVENTIL HFA, VENTOLIN HFA, PROAIR HFA Take 2 Puffs by inhalation every four (4) hours as needed for Wheezing or Shortness of Breath (or cough). lisinopril 10 mg tablet Commonly known as:  Nonah Joselin Take  by mouth daily. losartan-hydroCHLOROthiazide 50-12.5 mg per tablet Commonly known as:  HYZAAR Take 1 Tab by mouth daily. metoprolol tartrate 50 mg tablet Commonly known as:  LOPRESSOR Take  by mouth two (2) times a day. oxyCODONE IR 10 mg Tab immediate release tablet Commonly known as:  Danne Copper Take 1 Tab by mouth every six (6) hours as needed. Max Daily Amount: 40 mg. Indications: PAIN  
  
 oxyCODONE-acetaminophen 5-325 mg per tablet Commonly known as:  PERCOCET  
 Take 1 Tab by mouth every four (4) hours as needed for Pain. Max Daily Amount: 6 Tabs. Indications: PAIN  
  
 predniSONE 10 mg dose pack Commonly known as:  STERAPRED DS Take 6, 5, 4, 3, 2, and 1 tab po q a.m. On successive days  
  
 promethazine 25 mg tablet Commonly known as:  PHENERGAN Take 1 Tab by mouth every six (6) hours as needed for Nausea. Introducing [facility]! Deysi Hamilton introduces [employer] patient portal. Now you can access parts of your medical record, email your doctor's office, and request medication refills online. 1. In your internet browser, go to [email or web address] 2. Click on the First Time User? Click Here link in the Sign In box. You will see the New Member Sign Up page. 3. Enter your [employer] Access Code exactly as it appears below. You will not need to use this code after youve completed the sign-up process. If you do not sign up before the expiration date, you must request a new code. · [employer] Access Code: [email or web address] Expires: 4/4/2017  4:26 PM 
 
4. Enter the last four digits of your Social Security Number (xxxx) and Date of Birth (mm/dd/yyyy) as indicated and click Submit. You will be taken to the next sign-up page. 5. Create a [employer] ID. This will be your [employer] login ID and cannot be changed, so think of one that is secure and easy to remember. 6. Create a [employer] password. You can change your password at any time. 7. Enter your Password Reset Question and Answer. This can be used at a later time if you forget your password. 8. Enter your e-mail address. You will receive e-mail notification when new information is available in [address] 9. Click Sign Up. You can now view and download portions of your medical record. 10. Click the Download Summary menu link to download a portable copy of your medical information.  
 
If you have questions, please visit the Frequently Asked Questions section of the [employer]. Remember, [employer] is NOT to be used for urgent needs. For medical emergencies, dial 911. Now available from your iPhone and Android! Please provide this summary of care documentation to your next provider. Your primary care clinician is listed as Denzel Oliver. If you have any questions after today's visit, please call [phone].

## 2017-02-17 NOTE — PROGRESS NOTES
HISTORY OF PRESENT ILLNESS:  Mr. Darell Grey returns. He is five weeks status post his left knee arthroscopy. Generally, he is doing well. He was discharged from physical therapy due to nonattendance. He apparently only attended three sessions. He had met his goals and there was no increased pain. He is expecting to return to work sometime around the 27th of this month. Compared to prior to surgery, his pain has resolved entirely. PHYSICAL EXAM:  The left anterior knee over the medial and lateral joint portals reveals intact surgical incisions well-healed, no tenderness, and no evidence of wound dehiscence or infection. The lateral joint surgery site does reveal slightly a hint of soft tissue swelling with no fluctuance or pain. Active motion is 110-0°. There is no instability on varus and valgus stressing. Distal sensation is intact fully to the left lower extremity. There is no calf tenderness or evidence of DVT. PLAN:  The patient was given a note to return to work full duty on February 27, 2017. He will return to the office in six weeks. Again, if he feels like he is doing well enough that he does not need to be seen, then certainly, he can follow on a prn basis.

## 2017-02-17 NOTE — LETTER
NOTIFICATION RETURN TO WORK / SCHOOL 
 
2/17/2017 1:28 PM 
 
Mr. Ricci Khalil [address] [address] To Whom It May Concern: 
 
Ricci Khalil is currently under the care of [address] [place] [address]. He will return to work/school on: 2-27-17 with no restrictions. If there are questions or concerns please have the patient contact our office.  
 
 
 
Sincerely, 
 
 
Satya Bae PA-C

## 2017-02-20 ENCOUNTER — APPOINTMENT (OUTPATIENT)
Dept: PHYSICAL THERAPY | Age: 42
End: 2017-02-20
Payer: COMMERCIAL

## 2017-02-22 ENCOUNTER — APPOINTMENT (OUTPATIENT)
Dept: PHYSICAL THERAPY | Age: 42
End: 2017-02-22
Payer: COMMERCIAL

## 2017-02-24 ENCOUNTER — APPOINTMENT (OUTPATIENT)
Dept: PHYSICAL THERAPY | Age: 42
End: 2017-02-24
Payer: COMMERCIAL

## 2017-11-22 ENCOUNTER — HOSPITAL ENCOUNTER (EMERGENCY)
Age: 42
Discharge: HOME | End: 2017-11-22
Attending: EMERGENCY MEDICINE | Admitting: EMERGENCY MEDICINE
Payer: COMMERCIAL

## 2017-11-22 VITALS
TEMPERATURE: 98.1 F | SYSTOLIC BLOOD PRESSURE: 178 MMHG | HEART RATE: 73 BPM | RESPIRATION RATE: 18 BRPM | OXYGEN SATURATION: 98 % | HEIGHT: 74 IN | WEIGHT: 260 LBS | BODY MASS INDEX: 33.37 KG/M2 | DIASTOLIC BLOOD PRESSURE: 114 MMHG

## 2017-11-22 DIAGNOSIS — J06.9 ACUTE UPPER RESPIRATORY INFECTION: ICD-10-CM

## 2017-11-22 DIAGNOSIS — I10 HYPERTENSION, UNSPECIFIED TYPE: ICD-10-CM

## 2017-11-22 DIAGNOSIS — H10.31 ACUTE BACTERIAL CONJUNCTIVITIS OF RIGHT EYE: Primary | ICD-10-CM

## 2017-11-22 PROCEDURE — 99282 EMERGENCY DEPT VISIT SF MDM: CPT

## 2017-11-22 RX ORDER — ERYTHROMYCIN 5 MG/G
OINTMENT OPHTHALMIC
Qty: 1 TUBE | Refills: 0 | Status: SHIPPED | OUTPATIENT
Start: 2017-11-22 | End: 2017-11-29

## 2017-11-22 RX ORDER — FLUTICASONE PROPIONATE 50 MCG
2 SPRAY, SUSPENSION (ML) NASAL DAILY
Qty: 1 BOTTLE | Refills: 0 | Status: ON HOLD | OUTPATIENT
Start: 2017-11-22 | End: 2019-08-16

## 2017-11-22 NOTE — DISCHARGE INSTRUCTIONS
Pinkeye: Care Instructions  Your Care Instructions    Pinkeye is redness and swelling of the eye surface and the conjunctiva (the lining of the eyelid and the covering of the white part of the eye). Pinkeye is also called conjunctivitis. Pinkeye is often caused by infection with bacteria or a virus. Dry air, allergies, smoke, and chemicals are other common causes. Pinkeye often clears on its own in 7 to 10 days. Antibiotics only help if the pinkeye is caused by bacteria. Pinkeye caused by infection spreads easily. If an allergy or chemical is causing pinkeye, it will not go away unless you can avoid whatever is causing it. Follow-up care is a key part of your treatment and safety. Be sure to make and go to all appointments, and call your doctor if you are having problems. It's also a good idea to know your test results and keep a list of the medicines you take. How can you care for yourself at home? · Wash your hands often. Always wash them before and after you treat pinkeye or touch your eyes or face. · Use moist cotton or a clean, wet cloth to remove crust. Wipe from the inside corner of the eye to the outside. Use a clean part of the cloth for each wipe. · Put cold or warm wet cloths on your eye a few times a day if the eye hurts. · Do not wear contact lenses or eye makeup until the pinkeye is gone. Throw away any eye makeup you were using when you got pinkeye. Clean your contacts and storage case. If you wear disposable contacts, use a new pair when your eye has cleared and it is safe to wear contacts again. · If the doctor gave you antibiotic ointment or eyedrops, use them as directed. Use the medicine for as long as instructed, even if your eye starts looking better soon. Keep the bottle tip clean, and do not let it touch the eye area. · To put in eyedrops or ointment:  ¨ Tilt your head back, and pull your lower eyelid down with one finger.   ¨ Drop or squirt the medicine inside the lower lid.  ¨ Close your eye for 30 to 60 seconds to let the drops or ointment move around. ¨ Do not touch the ointment or dropper tip to your eyelashes or any other surface. · Do not share towels, pillows, or washcloths while you have pinkeye. When should you call for help? Call your doctor now or seek immediate medical care if:  ? · You have pain in your eye, not just irritation on the surface. ? · You have a change in vision or loss of vision. ? · You have an increase in discharge from the eye.   ? · Your eye has not started to improve or begins to get worse within 48 hours after you start using antibiotics. ? · Pinkeye lasts longer than 7 days. ? Watch closely for changes in your health, and be sure to contact your doctor if you have any problems. Where can you learn more? Go to http://lay-arminda.info/. Enter Y392 in the search box to learn more about \"Pinkeye: Care Instructions. \"  Current as of: March 20, 2017  Content Version: 11.4  © 6240-1624 [employer]. Care instructions adapted under license by [employer] (which disclaims liability or warranty for this information). If you have questions about a medical condition or this instruction, always ask your healthcare professional. [address] any warranty or liability for your use of this information. High Blood Pressure: Care Instructions  Your Care Instructions    If your blood pressure is usually above 140/90, you have high blood pressure, or hypertension. That means the top number is 140 or higher or the bottom number is 90 or higher, or both. Despite what a lot of people think, high blood pressure usually doesn't cause headaches or make you feel dizzy or lightheaded. It usually has no symptoms. But it does increase your risk for heart attack, stroke, and kidney or eye damage. The higher your blood pressure, the more your risk increases.   Your doctor will give you a goal for your blood pressure. Your goal will be based on your health and your age. An example of a goal is to keep your blood pressure below 140/90. Lifestyle changes, such as eating healthy and being active, are always important to help lower blood pressure. You might also take medicine to reach your blood pressure goal.  Follow-up care is a key part of your treatment and safety. Be sure to make and go to all appointments, and call your doctor if you are having problems. It's also a good idea to know your test results and keep a list of the medicines you take. How can you care for yourself at home? Medical treatment  · If you stop taking your medicine, your blood pressure will go back up. You may take one or more types of medicine to lower your blood pressure. Be safe with medicines. Take your medicine exactly as prescribed. Call your doctor if you think you are having a problem with your medicine. · Talk to your doctor before you start taking aspirin every day. Aspirin can help certain people lower their risk of a heart attack or stroke. But taking aspirin isn't right for everyone, because it can cause serious bleeding. · See your doctor regularly. You may need to see the doctor more often at first or until your blood pressure comes down. · If you are taking blood pressure medicine, talk to your doctor before you take decongestants or anti-inflammatory medicine, such as ibuprofen. Some of these medicines can raise blood pressure. · Learn how to check your blood pressure at home. Lifestyle changes  · Stay at a healthy weight. This is especially important if you put on weight around the waist. Losing even 10 pounds can help you lower your blood pressure. · If your doctor recommends it, get more exercise. Walking is a good choice. Bit by bit, increase the amount you walk every day. Try for at least 30 minutes on most days of the week. You also may want to swim, bike, or do other activities.   · Avoid or limit alcohol. Talk to your doctor about whether you can drink any alcohol. · Try to limit how much sodium you eat to less than 2,300 milligrams (mg) a day. Your doctor may ask you to try to eat less than 1,500 mg a day. · Eat plenty of fruits (such as bananas and oranges), vegetables, legumes, whole grains, and low-fat dairy products. · Lower the amount of saturated fat in your diet. Saturated fat is found in animal products such as milk, cheese, and meat. Limiting these foods may help you lose weight and also lower your risk for heart disease. · Do not smoke. Smoking increases your risk for heart attack and stroke. If you need help quitting, talk to your doctor about stop-smoking programs and medicines. These can increase your chances of quitting for good. When should you call for help? Call 911 anytime you think you may need emergency care. This may mean having symptoms that suggest that your blood pressure is causing a serious heart or blood vessel problem. Your blood pressure may be over 180/110. ? For example, call 911 if:  ? · You have symptoms of a heart attack. These may include:  ¨ Chest pain or pressure, or a strange feeling in the chest.  ¨ Sweating. ¨ Shortness of breath. ¨ Nausea or vomiting. ¨ Pain, pressure, or a strange feeling in the back, neck, jaw, or upper belly or in one or both shoulders or arms. ¨ Lightheadedness or sudden weakness. ¨ A fast or irregular heartbeat. ? · You have symptoms of a stroke. These may include:  ¨ Sudden numbness, tingling, weakness, or loss of movement in your face, arm, or leg, especially on only one side of your body. ¨ Sudden vision changes. ¨ Sudden trouble speaking. ¨ Sudden confusion or trouble understanding simple statements. ¨ Sudden problems with walking or balance. ¨ A sudden, severe headache that is different from past headaches. ? · You have severe back or belly pain. ?Do not wait until your blood pressure comes down on its own.  Get help right away. ?Call your doctor now or seek immediate care if:  ? · Your blood pressure is much higher than normal (such as 180/110 or higher), but you don't have symptoms. ? · You think high blood pressure is causing symptoms, such as:  ¨ Severe headache. ¨ Blurry vision. ? Watch closely for changes in your health, and be sure to contact your doctor if:  ? · Your blood pressure measures 140/90 or higher at least 2 times. That means the top number is 140 or higher or the bottom number is 90 or higher, or both. ? · You think you may be having side effects from your blood pressure medicine. ? · Your blood pressure is usually normal, but it goes above normal at least 2 times. Where can you learn more? Go to http://lay-arminda.info/. Enter Y835 in the search box to learn more about \"High Blood Pressure: Care Instructions. \"  Current as of: September 21, 2016  Content Version: 11.4  © 4954-5490 [employer]. Care instructions adapted under license by [employer] (which disclaims liability or warranty for this information). If you have questions about a medical condition or this instruction, always ask your healthcare professional. [place] [address] any warranty or liability for your use of this information. Upper Respiratory Infection (Cold): Care Instructions  Your Care Instructions    An upper respiratory infection, or URI, is an infection of the nose, sinuses, or throat. URIs are spread by coughs, sneezes, and direct contact. The common cold is the most frequent kind of URI. The flu and sinus infections are other kinds of URIs. Almost all URIs are caused by viruses. Antibiotics won't cure them. But you can treat most infections with home care. This may include drinking lots of fluids and taking over-the-counter pain medicine. You will probably feel better in 4 to 10 days.   The doctor has checked you carefully, but problems can develop later. If you notice any problems or new symptoms, get medical treatment right away. Follow-up care is a key part of your treatment and safety. Be sure to make and go to all appointments, and call your doctor if you are having problems. It's also a good idea to know your test results and keep a list of the medicines you take. How can you care for yourself at home? · To prevent dehydration, drink plenty of fluids, enough so that your urine is light yellow or clear like water. Choose water and other caffeine-free clear liquids until you feel better. If you have kidney, heart, or liver disease and have to limit fluids, talk with your doctor before you increase the amount of fluids you drink. · Take an over-the-counter pain medicine, such as acetaminophen (Tylenol), ibuprofen (Advil, Motrin), or naproxen (Aleve). Read and follow all instructions on the label. · Before you use cough and cold medicines, check the label. These medicines may not be safe for young children or for people with certain health problems. · Be careful when taking over-the-counter cold or flu medicines and Tylenol at the same time. Many of these medicines have acetaminophen, which is Tylenol. Read the labels to make sure that you are not taking more than the recommended dose. Too much acetaminophen (Tylenol) can be harmful. · Get plenty of rest.  · Do not smoke or allow others to smoke around you. If you need help quitting, talk to your doctor about stop-smoking programs and medicines. These can increase your chances of quitting for good. When should you call for help? Call 911 anytime you think you may need emergency care. For example, call if:  ? · You have severe trouble breathing. ?Call your doctor now or seek immediate medical care if:  ? · You seem to be getting much sicker. ? · You have new or worse trouble breathing. ? · You have a new or higher fever. ? · You have a new rash. ? Watch closely for changes in your health, and be sure to contact your doctor if:  ? · You have a new symptom, such as a sore throat, an earache, or sinus pain. ? · You cough more deeply or more often, especially if you notice more mucus or a change in the color of your mucus. ? · You do not get better as expected. Where can you learn more? Go to http://lay-arminda.info/. Enter P927 in the search box to learn more about \"Upper Respiratory Infection (Cold): Care Instructions. \"  Current as of: May 12, 2017  Content Version: 11.4  © 6371-7155 [employer]. Care instructions adapted under license by [employer] (which disclaims liability or warranty for this information). If you have questions about a medical condition or this instruction, always ask your healthcare professional. [address] any warranty or liability for your use of this information.

## 2017-11-22 NOTE — ED PROVIDER NOTES
HPI Comments: Nicolle Bruno is a 38 y.o. Male with hx of glaucoma and HTN that presents to the ED with a complaint of cough, congestion and a red eye x4 days. 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 Cough is productive with yellow sputum. Denies fever, HA, sinus pressure, vision change eye pain, NVD CP or SOB. No other complaints at this time    Patient is a 38 y.o. male presenting with URI and eye irritation. URI    Associated symptoms include congestion and cough. Pertinent negatives include no chest pain, no abdominal pain, no nausea, no vomiting and no headaches. Red Eye    Associated symptoms include eye redness. Pertinent negatives include no nausea, no vomiting, no fever and no dizziness. Past Medical History:   Diagnosis Date    Glaucoma     Heart murmur     Hypertension        History reviewed. No pertinent surgical history. Family History:   Problem Relation Age of Onset    Asthma Mother     Cancer Neg Hx     Diabetes Neg Hx     Heart Disease Neg Hx     Hypertension Neg Hx     Stroke Neg Hx        Social History     Social History    Marital status: SINGLE     Spouse name: N/A    Number of children: N/A    Years of education: N/A     Occupational History    Not on file. Social History Main Topics    Smoking status: Never Smoker    Smokeless tobacco: Never Used    Alcohol use No    Drug use: No    Sexual activity: Not on file     Other Topics Concern    Not on file     Social History Narrative         ALLERGIES: Review of patient's allergies indicates no known allergies. Review of Systems   Constitutional: Negative for fatigue and fever. HENT: Positive for congestion. Eyes: Positive for redness and itching. Respiratory: Positive for cough. Negative for shortness of breath. Cardiovascular: Negative for chest pain. Gastrointestinal: Negative for abdominal pain, nausea and vomiting. Musculoskeletal: Negative for back pain. Skin: Negative for wound. Neurological: Negative for dizziness and headaches. All other systems reviewed and are negative. Vitals:    11/22/17 1339 11/22/17 1340   BP:  (!) 178/114   Pulse: 73    Resp: 18    Temp: 98.1 °F (36.7 °C)    SpO2: 98%    Weight: 117.9 kg (260 lb)    Height: 6' 2\" (1.88 m)             Physical Exam   Constitutional: He is oriented to person, place, and time. He appears well-developed and well-nourished. No distress. HENT:   Head: Normocephalic and atraumatic. Nose: Mucosal edema and rhinorrhea present. Mouth/Throat: Uvula is midline, oropharynx is clear and moist and mucous membranes are normal. Normal dentition. Eyes: EOM are normal. Pupils are equal, round, and reactive to light. Right conjunctiva is injected. Neck: Normal range of motion. Cardiovascular: Normal rate and regular rhythm. Murmur heard. Pulmonary/Chest: Effort normal and breath sounds normal.   Musculoskeletal: Normal range of motion. Neurological: He is alert and oriented to person, place, and time. Skin: Skin is warm and dry. Psychiatric: He has a normal mood and affect. His behavior is normal.        MDM  Number of Diagnoses or Management Options  Diagnosis management comments: Impression: URI. Conjuncitivits    Plan: discharge home  Flonase daily  Antibiotic ointment  wash hands often  Follow up with PCP    Risk of Complications, Morbidity, and/or Mortality  Presenting problems: low  Diagnostic procedures: low  Management options: low    Patient Progress  Patient progress: stable    ED Course       Procedures           Vitals:  Patient Vitals for the past 12 hrs:   Temp Pulse Resp BP SpO2   11/22/17 1340 - - - (!) 178/114 -   11/22/17 1339 98.1 °F (36.7 °C) 73 18 - 98 %         Medications ordered:   Medications - No data to display      Lab findings:  No results found for this or any previous visit (from the past 12 hour(s)).         X-Ray, CT or other radiology findings or impressions:  No orders to display       Progress notes, Consult notes or additional Procedure notes:       Disposition:  Diagnosis: No diagnosis found. Disposition: discharge    Follow-up Information     None           Patient's Medications   Start Taking    No medications on file   Continue Taking    ACETAMINOPHEN-CODEINE (TYLENOL-CODEINE #3) 300-30 MG PER TABLET    1-2 tabs Qhs prn pain    ALBUTEROL (PROVENTIL HFA, VENTOLIN HFA, PROAIR HFA) 90 MCG/ACTUATION INHALER    Take 2 Puffs by inhalation every four (4) hours as needed for Wheezing or Shortness of Breath (or cough). LISINOPRIL (PRINIVIL, ZESTRIL) 10 MG TABLET    Take  by mouth daily. LOSARTAN-HYDROCHLOROTHIAZIDE (HYZAAR) 50-12.5 MG PER TABLET    Take 1 Tab by mouth daily. METOPROLOL TARTRATE (LOPRESSOR) 50 MG TABLET    Take  by mouth two (2) times a day. OXYCODONE IR (ROXICODONE) 10 MG TAB IMMEDIATE RELEASE TABLET    Take 1 Tab by mouth every six (6) hours as needed. Max Daily Amount: 40 mg. Indications: PAIN    OXYCODONE-ACETAMINOPHEN (PERCOCET) 5-325 MG PER TABLET    Take 1 Tab by mouth every four (4) hours as needed for Pain. Max Daily Amount: 6 Tabs. Indications: PAIN    PREDNISONE (STERAPRED DS) 10 MG DOSE PACK    Take 6, 5, 4, 3, 2, and 1 tab po q a.m. On successive days    PROMETHAZINE (PHENERGAN) 25 MG TABLET    Take 1 Tab by mouth every six (6) hours as needed for Nausea.    These Medications have changed    No medications on file   Stop Taking    No medications on file

## 2018-02-14 ENCOUNTER — APPOINTMENT (OUTPATIENT)
Dept: GENERAL RADIOLOGY | Age: 43
DRG: 287 | End: 2018-02-14
Attending: EMERGENCY MEDICINE
Payer: COMMERCIAL

## 2018-02-14 ENCOUNTER — HOSPITAL ENCOUNTER (INPATIENT)
Age: 43
LOS: 1 days | Discharge: HOME | DRG: 287 | End: 2018-02-15
Attending: EMERGENCY MEDICINE | Admitting: HOSPITALIST
Payer: COMMERCIAL

## 2018-02-14 DIAGNOSIS — R77.8 ELEVATED TROPONIN LEVEL: Primary | ICD-10-CM

## 2018-02-14 DIAGNOSIS — R07.2 PRECORDIAL PAIN: ICD-10-CM

## 2018-02-14 PROBLEM — I10 HTN (HYPERTENSION): Status: ACTIVE | Noted: 2018-02-14

## 2018-02-14 PROBLEM — R07.9 CHEST PAIN: Status: ACTIVE | Noted: 2018-02-14

## 2018-02-14 LAB
ACT BLD: 153 SECS (ref 79–138)
ALBUMIN SERPL-MCNC: 3.5 G/DL (ref 3.4–5)
ALBUMIN/GLOB SERPL: 0.9 {RATIO} (ref 0.8–1.7)
ALP SERPL-CCNC: 23 U/L (ref 45–117)
ALT SERPL-CCNC: 25 U/L (ref 16–61)
ANION GAP SERPL CALC-SCNC: 8 MMOL/L (ref 3–18)
ANION GAP SERPL CALC-SCNC: 8 MMOL/L (ref 3–18)
APTT PPP: 31.6 SEC (ref 23–36.4)
APTT PPP: 34.1 SEC (ref 23–36.4)
AST SERPL-CCNC: 22 U/L (ref 15–37)
ATRIAL RATE: 73 BPM
ATRIAL RATE: 82 BPM
BASOPHILS # BLD: 0 K/UL (ref 0–0.06)
BASOPHILS # BLD: 0 K/UL (ref 0–0.06)
BASOPHILS NFR BLD: 0 % (ref 0–2)
BASOPHILS NFR BLD: 1 % (ref 0–2)
BILIRUB DIRECT SERPL-MCNC: 0.1 MG/DL (ref 0–0.2)
BILIRUB SERPL-MCNC: 0.5 MG/DL (ref 0.2–1)
BUN SERPL-MCNC: 16 MG/DL (ref 7–18)
BUN SERPL-MCNC: 21 MG/DL (ref 7–18)
BUN/CREAT SERPL: 11 (ref 12–20)
BUN/CREAT SERPL: 16 (ref 12–20)
CALCIUM SERPL-MCNC: 8.6 MG/DL (ref 8.5–10.1)
CALCIUM SERPL-MCNC: 9.1 MG/DL (ref 8.5–10.1)
CALCULATED P AXIS, ECG09: 42 DEGREES
CALCULATED P AXIS, ECG09: 48 DEGREES
CALCULATED R AXIS, ECG10: 13 DEGREES
CALCULATED R AXIS, ECG10: 39 DEGREES
CALCULATED T AXIS, ECG11: -6 DEGREES
CALCULATED T AXIS, ECG11: -66 DEGREES
CHLORIDE SERPL-SCNC: 102 MMOL/L (ref 100–108)
CHLORIDE SERPL-SCNC: 103 MMOL/L (ref 100–108)
CHOLEST SERPL-MCNC: 152 MG/DL
CO2 SERPL-SCNC: 27 MMOL/L (ref 21–32)
CO2 SERPL-SCNC: 30 MMOL/L (ref 21–32)
CREAT SERPL-MCNC: 1.31 MG/DL (ref 0.6–1.3)
CREAT SERPL-MCNC: 1.41 MG/DL (ref 0.6–1.3)
DIAGNOSIS, 93000: NORMAL
DIAGNOSIS, 93000: NORMAL
DIFFERENTIAL METHOD BLD: ABNORMAL
DIFFERENTIAL METHOD BLD: ABNORMAL
EOSINOPHIL # BLD: 0.3 K/UL (ref 0–0.4)
EOSINOPHIL # BLD: 0.4 K/UL (ref 0–0.4)
EOSINOPHIL NFR BLD: 4 % (ref 0–5)
EOSINOPHIL NFR BLD: 6 % (ref 0–5)
ERYTHROCYTE [DISTWIDTH] IN BLOOD BY AUTOMATED COUNT: 14.6 % (ref 11.6–14.5)
ERYTHROCYTE [DISTWIDTH] IN BLOOD BY AUTOMATED COUNT: 14.7 % (ref 11.6–14.5)
ERYTHROCYTE [DISTWIDTH] IN BLOOD BY AUTOMATED COUNT: 14.9 % (ref 11.6–14.5)
EST. AVERAGE GLUCOSE BLD GHB EST-MCNC: 123 MG/DL
GLOBULIN SER CALC-MCNC: 3.9 G/DL (ref 2–4)
GLUCOSE SERPL-MCNC: 103 MG/DL (ref 74–99)
GLUCOSE SERPL-MCNC: 118 MG/DL (ref 74–99)
HBA1C MFR BLD: 5.9 % (ref 4.2–5.6)
HCT VFR BLD AUTO: 38.8 % (ref 36–48)
HCT VFR BLD AUTO: 42 % (ref 36–48)
HCT VFR BLD AUTO: 44.1 % (ref 36–48)
HDLC SERPL-MCNC: 41 MG/DL (ref 40–60)
HDLC SERPL: 3.7 {RATIO} (ref 0–5)
HGB BLD-MCNC: 13.1 G/DL (ref 13–16)
HGB BLD-MCNC: 13.6 G/DL (ref 13–16)
HGB BLD-MCNC: 14.1 G/DL (ref 13–16)
INR PPP: 1.2 (ref 0.8–1.2)
LDLC SERPL CALC-MCNC: 98.8 MG/DL (ref 0–100)
LIPID PROFILE,FLP: NORMAL
LYMPHOCYTES # BLD: 0.6 K/UL (ref 0.9–3.6)
LYMPHOCYTES # BLD: 0.9 K/UL (ref 0.9–3.6)
LYMPHOCYTES NFR BLD: 16 % (ref 21–52)
LYMPHOCYTES NFR BLD: 8 % (ref 21–52)
MCH RBC QN AUTO: 25 PG (ref 24–34)
MCH RBC QN AUTO: 25.1 PG (ref 24–34)
MCH RBC QN AUTO: 25.9 PG (ref 24–34)
MCHC RBC AUTO-ENTMCNC: 32 G/DL (ref 31–37)
MCHC RBC AUTO-ENTMCNC: 32.4 G/DL (ref 31–37)
MCHC RBC AUTO-ENTMCNC: 33.8 G/DL (ref 31–37)
MCV RBC AUTO: 76.8 FL (ref 74–97)
MCV RBC AUTO: 77.6 FL (ref 74–97)
MCV RBC AUTO: 78.1 FL (ref 74–97)
MONOCYTES # BLD: 0.7 K/UL (ref 0.05–1.2)
MONOCYTES # BLD: 0.7 K/UL (ref 0.05–1.2)
MONOCYTES NFR BLD: 12 % (ref 3–10)
MONOCYTES NFR BLD: 9 % (ref 3–10)
NEUTS SEG # BLD: 3.6 K/UL (ref 1.8–8)
NEUTS SEG # BLD: 6.1 K/UL (ref 1.8–8)
NEUTS SEG NFR BLD: 65 % (ref 40–73)
NEUTS SEG NFR BLD: 79 % (ref 40–73)
P-R INTERVAL, ECG05: 160 MS
P-R INTERVAL, ECG05: 164 MS
PLATELET # BLD AUTO: 148 K/UL (ref 135–420)
PLATELET # BLD AUTO: 160 K/UL (ref 135–420)
PLATELET # BLD AUTO: 168 K/UL (ref 135–420)
PMV BLD AUTO: 11.2 FL (ref 9.2–11.8)
PMV BLD AUTO: 11.4 FL (ref 9.2–11.8)
PMV BLD AUTO: 11.6 FL (ref 9.2–11.8)
POTASSIUM SERPL-SCNC: 3.4 MMOL/L (ref 3.5–5.5)
POTASSIUM SERPL-SCNC: 3.7 MMOL/L (ref 3.5–5.5)
PROT SERPL-MCNC: 7.4 G/DL (ref 6.4–8.2)
PROTHROMBIN TIME: 14.8 SEC (ref 11.5–15.2)
Q-T INTERVAL, ECG07: 356 MS
Q-T INTERVAL, ECG07: 392 MS
QRS DURATION, ECG06: 94 MS
QRS DURATION, ECG06: 96 MS
QTC CALCULATION (BEZET), ECG08: 415 MS
QTC CALCULATION (BEZET), ECG08: 431 MS
RBC # BLD AUTO: 5.05 M/UL (ref 4.7–5.5)
RBC # BLD AUTO: 5.41 M/UL (ref 4.7–5.5)
RBC # BLD AUTO: 5.65 M/UL (ref 4.7–5.5)
SODIUM SERPL-SCNC: 137 MMOL/L (ref 136–145)
SODIUM SERPL-SCNC: 141 MMOL/L (ref 136–145)
TRIGL SERPL-MCNC: 61 MG/DL (ref ?–150)
TROPONIN I SERPL-MCNC: 0.33 NG/ML (ref 0–0.04)
TROPONIN I SERPL-MCNC: 0.33 NG/ML (ref 0–0.06)
TROPONIN I SERPL-MCNC: 0.34 NG/ML (ref 0–0.06)
TSH SERPL DL<=0.05 MIU/L-ACNC: 0.49 UIU/ML (ref 0.36–3.74)
VENTRICULAR RATE, ECG03: 73 BPM
VENTRICULAR RATE, ECG03: 82 BPM
VLDLC SERPL CALC-MCNC: 12.2 MG/DL
WBC # BLD AUTO: 5.5 K/UL (ref 4.6–13.2)
WBC # BLD AUTO: 6.3 K/UL (ref 4.6–13.2)
WBC # BLD AUTO: 7.7 K/UL (ref 4.6–13.2)

## 2018-02-14 PROCEDURE — 80048 BASIC METABOLIC PNL TOTAL CA: CPT | Performed by: EMERGENCY MEDICINE

## 2018-02-14 PROCEDURE — 90686 IIV4 VACC NO PRSV 0.5 ML IM: CPT | Performed by: INTERNAL MEDICINE

## 2018-02-14 PROCEDURE — 84484 ASSAY OF TROPONIN QUANT: CPT | Performed by: EMERGENCY MEDICINE

## 2018-02-14 PROCEDURE — 85610 PROTHROMBIN TIME: CPT | Performed by: HOSPITALIST

## 2018-02-14 PROCEDURE — B2111ZZ FLUOROSCOPY OF MULTIPLE CORONARY ARTERIES USING LOW OSMOLAR CONTRAST: ICD-10-PCS | Performed by: INTERNAL MEDICINE

## 2018-02-14 PROCEDURE — 93306 TTE W/DOPPLER COMPLETE: CPT

## 2018-02-14 PROCEDURE — 74011250637 HC RX REV CODE- 250/637: Performed by: HOSPITALIST

## 2018-02-14 PROCEDURE — 99218 HC RM OBSERVATION: CPT

## 2018-02-14 PROCEDURE — 85730 THROMBOPLASTIN TIME PARTIAL: CPT | Performed by: EMERGENCY MEDICINE

## 2018-02-14 PROCEDURE — 99284 EMERGENCY DEPT VISIT MOD MDM: CPT

## 2018-02-14 PROCEDURE — 85025 COMPLETE CBC W/AUTO DIFF WBC: CPT | Performed by: EMERGENCY MEDICINE

## 2018-02-14 PROCEDURE — B2151ZZ FLUOROSCOPY OF LEFT HEART USING LOW OSMOLAR CONTRAST: ICD-10-PCS | Performed by: INTERNAL MEDICINE

## 2018-02-14 PROCEDURE — 65660000000 HC RM CCU STEPDOWN

## 2018-02-14 PROCEDURE — 85347 COAGULATION TIME ACTIVATED: CPT

## 2018-02-14 PROCEDURE — 74011250636 HC RX REV CODE- 250/636: Performed by: EMERGENCY MEDICINE

## 2018-02-14 PROCEDURE — 93005 ELECTROCARDIOGRAM TRACING: CPT

## 2018-02-14 PROCEDURE — 85027 COMPLETE CBC AUTOMATED: CPT | Performed by: HOSPITALIST

## 2018-02-14 PROCEDURE — 80076 HEPATIC FUNCTION PANEL: CPT | Performed by: HOSPITALIST

## 2018-02-14 PROCEDURE — 83036 HEMOGLOBIN GLYCOSYLATED A1C: CPT | Performed by: PHYSICIAN ASSISTANT

## 2018-02-14 PROCEDURE — 84443 ASSAY THYROID STIM HORMONE: CPT | Performed by: HOSPITALIST

## 2018-02-14 PROCEDURE — 36415 COLL VENOUS BLD VENIPUNCTURE: CPT | Performed by: HOSPITALIST

## 2018-02-14 PROCEDURE — 80061 LIPID PANEL: CPT | Performed by: PHYSICIAN ASSISTANT

## 2018-02-14 PROCEDURE — 90471 IMMUNIZATION ADMIN: CPT

## 2018-02-14 PROCEDURE — 74011636320 HC RX REV CODE- 636/320: Performed by: INTERNAL MEDICINE

## 2018-02-14 PROCEDURE — 85730 THROMBOPLASTIN TIME PARTIAL: CPT | Performed by: HOSPITALIST

## 2018-02-14 PROCEDURE — 74011250637 HC RX REV CODE- 250/637: Performed by: EMERGENCY MEDICINE

## 2018-02-14 PROCEDURE — 71046 X-RAY EXAM CHEST 2 VIEWS: CPT

## 2018-02-14 PROCEDURE — 99152 MOD SED SAME PHYS/QHP 5/>YRS: CPT

## 2018-02-14 PROCEDURE — 4A023N7 MEASUREMENT OF CARDIAC SAMPLING AND PRESSURE, LEFT HEART, PERCUTANEOUS APPROACH: ICD-10-PCS | Performed by: INTERNAL MEDICINE

## 2018-02-14 PROCEDURE — 74011250636 HC RX REV CODE- 250/636: Performed by: INTERNAL MEDICINE

## 2018-02-14 PROCEDURE — 80048 BASIC METABOLIC PNL TOTAL CA: CPT | Performed by: HOSPITALIST

## 2018-02-14 PROCEDURE — 74011000250 HC RX REV CODE- 250: Performed by: INTERNAL MEDICINE

## 2018-02-14 RX ORDER — ATORVASTATIN CALCIUM 20 MG/1
20 TABLET, FILM COATED ORAL
Status: DISCONTINUED | OUTPATIENT
Start: 2018-02-14 | End: 2018-02-15 | Stop reason: HOSPADM

## 2018-02-14 RX ORDER — VERAPAMIL HYDROCHLORIDE 2.5 MG/ML
2.5-5 INJECTION, SOLUTION INTRAVENOUS ONCE
Status: COMPLETED | OUTPATIENT
Start: 2018-02-14 | End: 2018-02-14

## 2018-02-14 RX ORDER — SODIUM CHLORIDE 0.9 % (FLUSH) 0.9 %
5-10 SYRINGE (ML) INJECTION EVERY 8 HOURS
Status: DISCONTINUED | OUTPATIENT
Start: 2018-02-14 | End: 2018-02-15 | Stop reason: HOSPADM

## 2018-02-14 RX ORDER — GUAIFENESIN 100 MG/5ML
81 LIQUID (ML) ORAL
Status: COMPLETED | OUTPATIENT
Start: 2018-02-14 | End: 2018-02-14

## 2018-02-14 RX ORDER — BIVALIRUDIN 250 MG/5ML
0.75 INJECTION, POWDER, LYOPHILIZED, FOR SOLUTION INTRAVENOUS ONCE
Status: DISCONTINUED | OUTPATIENT
Start: 2018-02-14 | End: 2018-02-14 | Stop reason: HOSPADM

## 2018-02-14 RX ORDER — SODIUM CHLORIDE 0.9 % (FLUSH) 0.9 %
5-10 SYRINGE (ML) INJECTION AS NEEDED
Status: DISCONTINUED | OUTPATIENT
Start: 2018-02-14 | End: 2018-02-15 | Stop reason: HOSPADM

## 2018-02-14 RX ORDER — ACETAMINOPHEN 325 MG/1
650 TABLET ORAL
Status: DISCONTINUED | OUTPATIENT
Start: 2018-02-14 | End: 2018-02-15 | Stop reason: HOSPADM

## 2018-02-14 RX ORDER — ACETAMINOPHEN AND CODEINE PHOSPHATE 300; 30 MG/1; MG/1
1 TABLET ORAL
Status: DISCONTINUED | OUTPATIENT
Start: 2018-02-14 | End: 2018-02-15 | Stop reason: HOSPADM

## 2018-02-14 RX ORDER — MIDAZOLAM HYDROCHLORIDE 1 MG/ML
.5-2 INJECTION, SOLUTION INTRAMUSCULAR; INTRAVENOUS
Status: DISCONTINUED | OUTPATIENT
Start: 2018-02-14 | End: 2018-02-14 | Stop reason: HOSPADM

## 2018-02-14 RX ORDER — DOCUSATE SODIUM 100 MG/1
100 CAPSULE, LIQUID FILLED ORAL 2 TIMES DAILY
Status: DISCONTINUED | OUTPATIENT
Start: 2018-02-14 | End: 2018-02-15 | Stop reason: HOSPADM

## 2018-02-14 RX ORDER — FLUTICASONE PROPIONATE 50 MCG
2 SPRAY, SUSPENSION (ML) NASAL DAILY
Status: DISCONTINUED | OUTPATIENT
Start: 2018-02-15 | End: 2018-02-15 | Stop reason: HOSPADM

## 2018-02-14 RX ORDER — METOPROLOL TARTRATE 50 MG/1
50 TABLET ORAL 2 TIMES DAILY
Status: DISCONTINUED | OUTPATIENT
Start: 2018-02-14 | End: 2018-02-15 | Stop reason: HOSPADM

## 2018-02-14 RX ORDER — FENTANYL CITRATE 50 UG/ML
12.5-1 INJECTION, SOLUTION INTRAMUSCULAR; INTRAVENOUS
Status: DISCONTINUED | OUTPATIENT
Start: 2018-02-14 | End: 2018-02-14 | Stop reason: HOSPADM

## 2018-02-14 RX ORDER — ALBUTEROL SULFATE 0.83 MG/ML
2.5 SOLUTION RESPIRATORY (INHALATION)
Status: DISCONTINUED | OUTPATIENT
Start: 2018-02-14 | End: 2018-02-15 | Stop reason: HOSPADM

## 2018-02-14 RX ORDER — LABETALOL HCL 20 MG/4 ML
20 SYRINGE (ML) INTRAVENOUS
Status: DISCONTINUED | OUTPATIENT
Start: 2018-02-14 | End: 2018-02-14

## 2018-02-14 RX ORDER — HEPARIN SODIUM 200 [USP'U]/100ML
1000 INJECTION, SOLUTION INTRAVENOUS CONTINUOUS
Status: DISCONTINUED | OUTPATIENT
Start: 2018-02-14 | End: 2018-02-14 | Stop reason: HOSPADM

## 2018-02-14 RX ORDER — HEPARIN SODIUM 10000 [USP'U]/100ML
8.2-25 INJECTION, SOLUTION INTRAVENOUS
Status: DISCONTINUED | OUTPATIENT
Start: 2018-02-14 | End: 2018-02-15 | Stop reason: HOSPADM

## 2018-02-14 RX ORDER — HEPARIN SODIUM 1000 [USP'U]/ML
1000-10000 INJECTION, SOLUTION INTRAVENOUS; SUBCUTANEOUS
Status: DISCONTINUED | OUTPATIENT
Start: 2018-02-14 | End: 2018-02-14 | Stop reason: HOSPADM

## 2018-02-14 RX ORDER — LIDOCAINE HYDROCHLORIDE 10 MG/ML
1-30 INJECTION, SOLUTION EPIDURAL; INFILTRATION; INTRACAUDAL; PERINEURAL
Status: DISCONTINUED | OUTPATIENT
Start: 2018-02-14 | End: 2018-02-14 | Stop reason: HOSPADM

## 2018-02-14 RX ORDER — GUAIFENESIN 100 MG/5ML
81 LIQUID (ML) ORAL DAILY
Status: DISCONTINUED | OUTPATIENT
Start: 2018-02-14 | End: 2018-02-14

## 2018-02-14 RX ORDER — METOPROLOL TARTRATE 25 MG/1
25 TABLET, FILM COATED ORAL EVERY 12 HOURS
Status: DISCONTINUED | OUTPATIENT
Start: 2018-02-14 | End: 2018-02-14 | Stop reason: SDUPTHER

## 2018-02-14 RX ORDER — ADHESIVE BANDAGE
30 BANDAGE TOPICAL DAILY PRN
Status: DISCONTINUED | OUTPATIENT
Start: 2018-02-14 | End: 2018-02-15 | Stop reason: HOSPADM

## 2018-02-14 RX ORDER — LISINOPRIL 10 MG/1
10 TABLET ORAL DAILY
Status: DISCONTINUED | OUTPATIENT
Start: 2018-02-15 | End: 2018-02-15 | Stop reason: HOSPADM

## 2018-02-14 RX ORDER — HEPARIN SODIUM 1000 [USP'U]/ML
33 INJECTION, SOLUTION INTRAVENOUS; SUBCUTANEOUS ONCE
Status: COMPLETED | OUTPATIENT
Start: 2018-02-14 | End: 2018-02-14

## 2018-02-14 RX ADMIN — NITROGLYCERIN 1 INCH: 20 OINTMENT TOPICAL at 06:44

## 2018-02-14 RX ADMIN — MIDAZOLAM HYDROCHLORIDE 1 MG: 1 INJECTION, SOLUTION INTRAMUSCULAR; INTRAVENOUS at 14:42

## 2018-02-14 RX ADMIN — ACETAMINOPHEN AND CODEINE PHOSPHATE 1 TABLET: 300; 30 TABLET ORAL at 18:22

## 2018-02-14 RX ADMIN — Medication 10 ML: at 22:35

## 2018-02-14 RX ADMIN — INFLUENZA VIRUS VACCINE 0.5 ML: 15; 15; 15; 15 SUSPENSION INTRAMUSCULAR at 20:15

## 2018-02-14 RX ADMIN — LIDOCAINE HYDROCHLORIDE 8 ML: 10 INJECTION, SOLUTION EPIDURAL; INFILTRATION; INTRACAUDAL; PERINEURAL at 14:43

## 2018-02-14 RX ADMIN — ATORVASTATIN CALCIUM 20 MG: 20 TABLET, FILM COATED ORAL at 22:35

## 2018-02-14 RX ADMIN — METOPROLOL TARTRATE 50 MG: 50 TABLET ORAL at 18:22

## 2018-02-14 RX ADMIN — HEPARIN SODIUM 4000 UNITS: 1000 INJECTION, SOLUTION INTRAVENOUS; SUBCUTANEOUS at 09:37

## 2018-02-14 RX ADMIN — VERAPAMIL HYDROCHLORIDE 5 MG: 2.5 INJECTION INTRAVENOUS at 14:46

## 2018-02-14 RX ADMIN — HEPARIN SODIUM 2000 UNITS: 200 INJECTION, SOLUTION INTRAVENOUS at 14:00

## 2018-02-14 RX ADMIN — Medication 10 ML: at 18:23

## 2018-02-14 RX ADMIN — MIDAZOLAM HYDROCHLORIDE 1 MG: 1 INJECTION, SOLUTION INTRAMUSCULAR; INTRAVENOUS at 14:44

## 2018-02-14 RX ADMIN — Medication 5 ML: at 22:00

## 2018-02-14 RX ADMIN — Medication 10 ML: at 22:36

## 2018-02-14 RX ADMIN — ACETAMINOPHEN 650 MG: 325 TABLET ORAL at 23:00

## 2018-02-14 RX ADMIN — ASPIRIN 81 MG 81 MG: 81 TABLET ORAL at 06:44

## 2018-02-14 RX ADMIN — DOCUSATE SODIUM 100 MG: 100 CAPSULE, LIQUID FILLED ORAL at 18:22

## 2018-02-14 RX ADMIN — FENTANYL CITRATE 25 MCG: 50 INJECTION INTRAMUSCULAR; INTRAVENOUS at 14:44

## 2018-02-14 RX ADMIN — HEPARIN SODIUM AND DEXTROSE 8.26 UNITS/KG/HR: 10000; 5 INJECTION INTRAVENOUS at 09:46

## 2018-02-14 RX ADMIN — NITROGLYCERIN 200 MCG: 5 INJECTION, SOLUTION INTRAVENOUS at 14:46

## 2018-02-14 RX ADMIN — IOPAMIDOL 80 ML: 612 INJECTION, SOLUTION INTRAVENOUS at 14:54

## 2018-02-14 RX ADMIN — FENTANYL CITRATE 50 MCG: 50 INJECTION INTRAMUSCULAR; INTRAVENOUS at 14:43

## 2018-02-14 NOTE — ED NOTES
Patient sat up on the side of the stretcher to eat some crackers and drink a diet ginger ale. Patient c/o feeling hot and weak. Dr. Mahesh Jackson notified and orders received.

## 2018-02-14 NOTE — IP AVS SNAPSHOT
Rachana Neff 
 
 
 [address] Patient: Graig Mcburney MRN: [id number] OAI:[birth year] A check joshua indicates which time of day the medication should be taken. My Medications CHANGE how you take these medications Instructions Each Dose to Equal  
 Morning Noon Evening Bedtime  
 lisinopril 10 mg tablet Commonly known as:  Shaikh Boor What changed:  how much to take Your last dose was: Your next dose is: Take 1 Tab by mouth daily. 10 mg  
    
   
   
   
  
 metoprolol tartrate 50 mg tablet Commonly known as:  LOPRESSOR What changed:  how much to take Your last dose was: Your next dose is: Take 1 Tab by mouth two (2) times a day. 50 mg CONTINUE taking these medications Instructions Each Dose to Equal  
 Morning Noon Evening Bedtime  
 acetaminophen-codeine 300-30 mg per tablet Commonly known as:  TYLENOL-CODEINE #3 Your last dose was: Your next dose is:    
   
   
 1-2 tabs Qhs prn pain  
     
   
   
   
  
 albuterol 90 mcg/actuation inhaler Commonly known as:  PROVENTIL HFA, VENTOLIN HFA, PROAIR HFA Your last dose was: Your next dose is: Take 2 Puffs by inhalation every four (4) hours as needed for Wheezing or Shortness of Breath (or cough). 2 Puff  
    
   
   
   
  
 fluticasone 50 mcg/actuation nasal spray Commonly known as:  Marsa Albe Your last dose was: Your next dose is: 2 Sprays by Both Nostrils route daily. 2 Danube STOP taking these medications   
 losartan-hydroCHLOROthiazide 50-12.5 mg per tablet Commonly known as:  HYZAAR  
   
  
 oxyCODONE IR 10 mg Tab immediate release tablet Commonly known as:  ROXICODONE  
   
  
 oxyCODONE-acetaminophen 5-325 mg per tablet Commonly known as:  PERCOCET  
   
 predniSONE 10 mg dose pack Commonly known as:  STERAPRED DS  
   
  
 promethazine 25 mg tablet Commonly known as:  PHENERGAN Where to Get Your Medications Information on where to get these meds will be given to you by the nurse or doctor. ! Ask your nurse or doctor about these medications  
  lisinopril 10 mg tablet  
 metoprolol tartrate 50 mg tablet

## 2018-02-14 NOTE — CONSULTS
[facility] - Consult Note    Consultation request by Jesica Rae MD for advice/opinion related to evaluating Elevated troponin    Date of  Admission: 2/14/2018  5:00 AM   Primary Care Physician:  Cassius Francis MD     Assessment:     Patient Active Problem List   Diagnosis Code    Elevated troponin R74.8       -Chest pain with typical and atypical features. Troponin indeterminate in setting of hypertension out of control. ECG with with likely LVH and strain but repeat with worsening inferolateral ST segment abnormalities. -Hypertension out of control. Patient is unclear of which antihypertensives he takes.  -Cough, severe yesterday, today with some improvement.  -Glaucoma.  -Childhood murmur. Plan: Will check repeat cardiac enzymes, ECG now. Will get bedside echocardiogram now. Will continue heparin infusion and nitro paste. Patient received ASA. Resume BB. Will check lipid panel and Hgb A1c. Will consider ischemic evaluation pending results of above testing with cardiac catheterization versus stress testing. History of Present Illness: This is a 38 y.o. male admitted for Elevated troponin. Patient complains of:  CP. Patient is a 38year old male who reports yesterday he had severe cough. He reports at 5 AM he woke up with severe left sided chest pain. He went to ER. He was treated with nitro paste and ASA. CP resolved. CP free now. Although did have mild pain with coughing on exam. Patient reports mild SOB. Patient denies orthopnea, PND, DON, palp, syncope. Patient denies previous cardiac work-up.     Cardiac risk factors:   HTN  Father with PPM  Childhood murmur    Review of Symptoms:   Constitutional: negative for fevers and chills  Eyes: positive for glaucoma, negative for changes  Ears, nose, mouth, throat, and face: positive for nasal congestion  Respiratory: positive for cough  Cardiovascular: positive for chest pain  Gastrointestinal: negative for vomiting and diarrhea  Genitourinary:negative for hematuria  Hematologic/lymphatic: negative for bleeding  Musculoskeletal:negative for muscle weakness  Neurological: negative for dizziness     Past Medical History:     Past Medical History:   Diagnosis Date    Glaucoma     Heart murmur     Hypertension          Social History:     Social History     Social History    Marital status: SINGLE     Spouse name: N/A    Number of children: N/A    Years of education: N/A     Social History Main Topics    Smoking status: Never Smoker    Smokeless tobacco: Never Used    Alcohol use No    Drug use: No    Sexual activity: Not Asked     Other Topics Concern    None     Social History Narrative        Family History:     Family History   Problem Relation Age of Onset    Asthma Mother     Cancer Neg Hx     Diabetes Neg Hx     Heart Disease Neg Hx     Hypertension Neg Hx     Stroke Neg Hx         Medications:   No Known Allergies     Current Facility-Administered Medications   Medication Dose Route Frequency    heparin 25,000 units in D5W 250 ml infusion  8.2-25 Units/kg/hr IntraVENous TITRATE     Current Outpatient Prescriptions   Medication Sig    losartan-hydroCHLOROthiazide (HYZAAR) 50-12.5 mg per tablet Take 1 Tab by mouth daily.  metoprolol tartrate (LOPRESSOR) 50 mg tablet Take  by mouth two (2) times a day.  lisinopril (PRINIVIL, ZESTRIL) 10 mg tablet Take  by mouth daily.  fluticasone (FLONASE) 50 mcg/actuation nasal spray 2 Sprays by Both Nostrils route daily.  oxyCODONE IR (ROXICODONE) 10 mg tab immediate release tablet Take 1 Tab by mouth every six (6) hours as needed. Max Daily Amount: 40 mg. Indications: PAIN    oxyCODONE-acetaminophen (PERCOCET) 5-325 mg per tablet Take 1 Tab by mouth every four (4) hours as needed for Pain. Max Daily Amount: 6 Tabs. Indications: PAIN    promethazine (PHENERGAN) 25 mg tablet Take 1 Tab by mouth every six (6) hours as needed for Nausea.     acetaminophen-codeine (TYLENOL-CODEINE #3) 300-30 mg per tablet 1-2 tabs Qhs prn pain    albuterol (PROVENTIL HFA, VENTOLIN HFA, PROAIR HFA) 90 mcg/actuation inhaler Take 2 Puffs by inhalation every four (4) hours as needed for Wheezing or Shortness of Breath (or cough).  predniSONE (STERAPRED DS) 10 mg dose pack Take 6, 5, 4, 3, 2, and 1 tab po q a.m. On successive days         Physical Exam:     Visit Vitals    /79    Pulse 87    Temp 98.6 °F (37 °C)    Resp 22    Ht 6' 2\" (1.88 m)    Wt 121.1 kg (267 lb)    SpO2 95%    BMI 34.28 kg/m2     BP Readings from Last 3 Encounters:   02/14/18 143/79   11/22/17 (!) 178/114   02/17/17 (!) 158/105     Pulse Readings from Last 3 Encounters:   02/14/18 87   11/22/17 73   02/17/17 70     Wt Readings from Last 3 Encounters:   02/14/18 121.1 kg (267 lb)   11/22/17 117.9 kg (260 lb)   02/17/17 126.6 kg (279 lb)       General:  alert, cooperative, no distress, appears stated age  Neck:  no JVD  Lungs:  clear to auscultation bilaterally  Heart:  regular rate and rhythm, S1, S2 normal, no murmur, click, rub or gallop  Abdomen:  abdomen is soft without significant tenderness, masses, organomegaly or guarding  Extremities:  extremities normal, atraumatic, no cyanosis or edema  Skin: Warm and dry.  no hyperpigmentation, vitiligo, or suspicious lesions  Neuro: alert, oriented x3, affect appropriate  Psych: non focal     Data Review:     Recent Labs      02/14/18   0933  02/14/18   0510   WBC  7.7  5.5   HGB  13.6  14.1   HCT  42.0  44.1   PLT  160  168     Recent Labs      02/14/18   0510   NA  141   K  3.7   CL  103   CO2  30   GLU  118*   BUN  21*   CREA  1.31*   CA  9.1       Results for orders placed or performed during the hospital encounter of 02/14/18   EKG, 12 LEAD, INITIAL   Result Value Ref Range    Ventricular Rate 82 BPM    Atrial Rate 82 BPM    P-R Interval 164 ms    QRS Duration 94 ms    Q-T Interval 356 ms    QTC Calculation (Bezet) 415 ms    Calculated P Axis 48 degrees    Calculated R Axis 39 degrees    Calculated T Axis -6 degrees    Diagnosis       Normal sinus rhythm  Minimal voltage criteria for LVH, may be normal variant  Nonspecific T wave abnormality  Abnormal ECG  When compared with ECG of 04-JAN-2017 16:55,  No significant change was found         All Cardiac Markers in the last 24 hours:    Lab Results   Component Value Date/Time    TROIQ 0.33 (H) 02/14/2018 08:30 AM    TROIQ 0.34 (H) 02/14/2018 05:10 AM       Last Lipid:    Lab Results   Component Value Date/Time    Cholesterol, total 125 06/07/2010 11:37 AM    HDL Cholesterol 29 (L) 06/07/2010 11:37 AM    LDL, calculated 63.4 06/07/2010 11:37 AM    Triglyceride 163 (H) 06/07/2010 11:37 AM    CHOL/HDL Ratio 4.3 06/07/2010 11:37 AM       Signed By: LOLA Lewis     February 14, 2018

## 2018-02-14 NOTE — H&P
History and Physical    Subjective:     Isaiah Tatum is a 38 y.o.  male who presents with chest pain that started this morning and woke him from sleep at about 5:30 this morning. He indicates that pain was concentrated in the precordial area without radiation, he denies any nausea, SOB, diaphoresis, dizziness or other accompanying symptoms. He went to [facility] ED and was noted to have an abnormal EKG and mildly elevated troponins. He was transferred to [facility] ED for cardiology evaluation and will be going to the cath lab shortly. He is currently chest pain free. Past Medical History:   Diagnosis Date    Glaucoma     Heart murmur     Hypertension       History reviewed. No pertinent surgical history. Family History   Problem Relation Age of Onset    Asthma Mother     Cancer Neg Hx     Diabetes Neg Hx     Heart Disease Neg Hx     Hypertension Neg Hx     Stroke Neg Hx       Social History   Substance Use Topics    Smoking status: Never Smoker    Smokeless tobacco: Never Used    Alcohol use No       Prior to Admission medications    Medication Sig Start Date End Date Taking? Authorizing Provider   losartan-hydroCHLOROthiazide (HYZAAR) 50-12.5 mg per tablet Take 1 Tab by mouth daily. Yes Historical Provider   metoprolol tartrate (LOPRESSOR) 50 mg tablet Take  by mouth two (2) times a day. Yes Historical Provider   lisinopril (PRINIVIL, ZESTRIL) 10 mg tablet Take  by mouth daily. Yes Historical Provider   fluticasone (FLONASE) 50 mcg/actuation nasal spray 2 Sprays by Both Nostrils route daily. 11/22/17   LOLA Chaney   oxyCODONE IR (ROXICODONE) 10 mg tab immediate release tablet Take 1 Tab by mouth every six (6) hours as needed. Max Daily Amount: 40 mg. Indications: PAIN 1/13/17   Leena Obrien PA-C   oxyCODONE-acetaminophen (PERCOCET) 5-325 mg per tablet Take 1 Tab by mouth every four (4) hours as needed for Pain. Max Daily Amount: 6 Tabs.  Indications: PAIN 1/9/17   Jesus Cartwright DEEPIKA Dickey   promethazine (PHENERGAN) 25 mg tablet Take 1 Tab by mouth every six (6) hours as needed for Nausea. 1/9/17   Jesus Cartwright DEEPIKA Dickey   acetaminophen-codeine (TYLENOL-CODEINE #3) 300-30 mg per tablet 1-2 tabs Qhs prn pain 12/30/16   Caty Campos MD   albuterol (PROVENTIL HFA, VENTOLIN HFA, PROAIR HFA) 90 mcg/actuation inhaler Take 2 Puffs by inhalation every four (4) hours as needed for Wheezing or Shortness of Breath (or cough). 10/13/15   Sourav Jones MD   predniSONE (STERAPRED DS) 10 mg dose pack Take 6, 5, 4, 3, 2, and 1 tab po q a.m. On successive days 10/13/15   Sourav Jones MD     No Known Allergies     Review of Systems:  A comprehensive review of systems was negative except for that written in the History of Present Illness. Objective: Intake and Output:            Physical Exam:   General appearance: alert, cooperative, no distress, appears stated age  Neck: supple, symmetrical, trachea midline, no adenopathy, thyroid: not enlarged, symmetric, no tenderness/mass/nodules, no carotid bruit and no JVD  Lungs: clear to auscultation bilaterally  Chest wall: no tenderness  Heart: regular rate and rhythm, S1, S2 normal, no murmur, click, rub or gallop  Abdomen: soft, non-tender.  Bowel sounds normal. No masses,  no organomegaly  Extremities: extremities normal, atraumatic, no cyanosis or edema  Neurologic: Grossly normal    Data Review:   Recent Results (from the past 24 hour(s))   EKG, 12 LEAD, INITIAL    Collection Time: 02/14/18  5:02 AM   Result Value Ref Range    Ventricular Rate 82 BPM    Atrial Rate 82 BPM    P-R Interval 164 ms    QRS Duration 94 ms    Q-T Interval 356 ms    QTC Calculation (Bezet) 415 ms    Calculated P Axis 48 degrees    Calculated R Axis 39 degrees    Calculated T Axis -6 degrees    Diagnosis       Normal sinus rhythm  Minimal voltage criteria for LVH, may be normal variant  Nonspecific T wave abnormality  Abnormal ECG  When compared with ECG of 04-JAN-2017 16:55,  No significant change was found     METABOLIC PANEL, BASIC    Collection Time: 02/14/18  5:10 AM   Result Value Ref Range    Sodium 141 136 - 145 mmol/L    Potassium 3.7 3.5 - 5.5 mmol/L    Chloride 103 100 - 108 mmol/L    CO2 30 21 - 32 mmol/L    Anion gap 8 3.0 - 18 mmol/L    Glucose 118 (H) 74 - 99 mg/dL    BUN 21 (H) 7.0 - 18 MG/DL    Creatinine 1.31 (H) 0.6 - 1.3 MG/DL    BUN/Creatinine ratio 16 12 - 20      GFR est AA >60 >60 ml/min/1.73m2    GFR est non-AA 59 (L) >60 ml/min/1.73m2    Calcium 9.1 8.5 - 10.1 MG/DL   CBC WITH AUTOMATED DIFF    Collection Time: 02/14/18  5:10 AM   Result Value Ref Range    WBC 5.5 4.6 - 13.2 K/uL    RBC 5.65 (H) 4.70 - 5.50 M/uL    HGB 14.1 13.0 - 16.0 g/dL    HCT 44.1 36.0 - 48.0 %    MCV 78.1 74.0 - 97.0 FL    MCH 25.0 24.0 - 34.0 PG    MCHC 32.0 31.0 - 37.0 g/dL    RDW 14.9 (H) 11.6 - 14.5 %    PLATELET 158 469 - 798 K/uL    MPV 11.6 9.2 - 11.8 FL    NEUTROPHILS 65 40 - 73 %    LYMPHOCYTES 16 (L) 21 - 52 %    MONOCYTES 12 (H) 3 - 10 %    EOSINOPHILS 6 (H) 0 - 5 %    BASOPHILS 1 0 - 2 %    ABS. NEUTROPHILS 3.6 1.8 - 8.0 K/UL    ABS. LYMPHOCYTES 0.9 0.9 - 3.6 K/UL    ABS. MONOCYTES 0.7 0.05 - 1.2 K/UL    ABS. EOSINOPHILS 0.4 0.0 - 0.4 K/UL    ABS.  BASOPHILS 0.0 0.0 - 0.06 K/UL    DF AUTOMATED     TROPONIN I    Collection Time: 02/14/18  5:10 AM   Result Value Ref Range    Troponin-I, Qt. 0.34 (H) 0.00 - 0.06 NG/ML   TROPONIN I    Collection Time: 02/14/18  8:30 AM   Result Value Ref Range    Troponin-I, Qt. 0.33 (H) 0.00 - 0.06 NG/ML   EKG, 12 LEAD, SUBSEQUENT    Collection Time: 02/14/18  8:31 AM   Result Value Ref Range    Ventricular Rate 73 BPM    Atrial Rate 73 BPM    P-R Interval 160 ms    QRS Duration 96 ms    Q-T Interval 392 ms    QTC Calculation (Bezet) 431 ms    Calculated P Axis 42 degrees    Calculated R Axis 13 degrees    Calculated T Axis -66 degrees    Diagnosis       Normal sinus rhythm  T wave abnormality, consider inferior ischemia  Abnormal ECG  When compared with ECG of 14-FEB-2018 05:02,  T wave inversion now evident in Inferior leads  Inverted T waves have replaced nonspecific T wave abnormality in Lateral   leads     PTT    Collection Time: 02/14/18  9:33 AM   Result Value Ref Range    aPTT 31.6 23.0 - 36.4 SEC   CBC WITH AUTOMATED DIFF    Collection Time: 02/14/18  9:33 AM   Result Value Ref Range    WBC 7.7 4.6 - 13.2 K/uL    RBC 5.41 4.70 - 5.50 M/uL    HGB 13.6 13.0 - 16.0 g/dL    HCT 42.0 36.0 - 48.0 %    MCV 77.6 74.0 - 97.0 FL    MCH 25.1 24.0 - 34.0 PG    MCHC 32.4 31.0 - 37.0 g/dL    RDW 14.7 (H) 11.6 - 14.5 %    PLATELET 700 220 - 333 K/uL    MPV 11.2 9.2 - 11.8 FL    NEUTROPHILS 79 (H) 40 - 73 %    LYMPHOCYTES 8 (L) 21 - 52 %    MONOCYTES 9 3 - 10 %    EOSINOPHILS 4 0 - 5 %    BASOPHILS 0 0 - 2 %    ABS. NEUTROPHILS 6.1 1.8 - 8.0 K/UL    ABS. LYMPHOCYTES 0.6 (L) 0.9 - 3.6 K/UL    ABS. MONOCYTES 0.7 0.05 - 1.2 K/UL    ABS. EOSINOPHILS 0.3 0.0 - 0.4 K/UL    ABS. BASOPHILS 0.0 0.0 - 0.06 K/UL    DF AUTOMATED           Assessment:     Active Problems:    Elevated troponin (2/14/2018)      HTN (hypertension) (2/14/2018)        Plan:     The patient will go to the cath lab then will be admitted to the stepdown unit. I have resumed his home medications and will defer anticoagulation and BP control post cath to Cardiology.       Signed By: Keenan Chávez MD     February 14, 2018

## 2018-02-14 NOTE — PROGRESS NOTES
Pt transported from ER to cardiac cath holding area and placed in cath holding bed 16. Pt placed on cardiac monitor VS stable ( see flow sheet ) . Pt denies any chest pain at this time, pts only complaint is bilateral finger numbness, all fingers on both hands. Pt understands he is here for a cardiac cath with Dr. Joey Golden and consents to procedure. Family is at bedside.

## 2018-02-14 NOTE — PROGRESS NOTES
D Stat removed from pts right wrist, pressure dressing applied. No bleeding or hematoma formation noted.

## 2018-02-14 NOTE — ROUTINE PROCESS
TRANSFER - IN REPORT:    Verbal report received from Christiano Ortega RN(name) on Omayra Jones  being received from [facility](unit) for ordered procedure. Report consisted of patients Situation, Background, Assessment and   Recommendations(SBAR). Information from the following report(s) SBAR, Intake/Output, MAR and Recent Results was reviewed with the receiving nurse. Opportunity for questions and clarification was provided. Assessment completed upon patients arrival to unit and care assumed.

## 2018-02-14 NOTE — ED NOTES
Received patient from [facility] for cardiac  Catheterization. No s/s of distress   noted upon arrival to the ER.  Heparin drip infusing at 10cc/hr or 1000u/hr

## 2018-02-14 NOTE — ED NOTES
8:42 AM  Received turnover from Dr Kevin Lora. Pt w/ cough, congestion, HTN, elevated Trop. Initial EKG w/o ischemic change. Pt received ASA and NTP. Awaiting bed at [facility]. Repeat EKG 08:31 w/ NSR, new T wave inversions inf lat leads relative to initial tracing. No ST segment elevation. BP has normalized. Repeat Troponin ordered and stable. Cardiology notified of new EKG changes. Case d/w Dr Francesca Daigle. Requests expedited transfer and heparin. Spoke w/ pt and updated him as regards to EKG and Troponin elevation. Pt will be ED to ED transfer. Currently denies CP, SOB. Results for Manav Marcelino (MRN [id number]) as of 2/14/2018 09:11   Ref. Range 2/14/2018 05:10 2/14/2018 05:24 2/14/2018 08:30 2/14/2018 08:31   Troponin-I, Qt. Latest Ref Range: 0.00 - 0.06 NG/ML 0.34 (H)  0.33 (H)      Diagnosis:   1. Elevated troponin level    2. Precordial pain          Disposition: to [facility]    Follow-up Information     None          Patient's Medications   Start Taking    No medications on file   Continue Taking    ACETAMINOPHEN-CODEINE (TYLENOL-CODEINE #3) 300-30 MG PER TABLET    1-2 tabs Qhs prn pain    ALBUTEROL (PROVENTIL HFA, VENTOLIN HFA, PROAIR HFA) 90 MCG/ACTUATION INHALER    Take 2 Puffs by inhalation every four (4) hours as needed for Wheezing or Shortness of Breath (or cough). FLUTICASONE (FLONASE) 50 MCG/ACTUATION NASAL SPRAY    2 Sprays by Both Nostrils route daily. LISINOPRIL (PRINIVIL, ZESTRIL) 10 MG TABLET    Take  by mouth daily. LOSARTAN-HYDROCHLOROTHIAZIDE (HYZAAR) 50-12.5 MG PER TABLET    Take 1 Tab by mouth daily. METOPROLOL TARTRATE (LOPRESSOR) 50 MG TABLET    Take  by mouth two (2) times a day. OXYCODONE IR (ROXICODONE) 10 MG TAB IMMEDIATE RELEASE TABLET    Take 1 Tab by mouth every six (6) hours as needed. Max Daily Amount: 40 mg. Indications: PAIN    OXYCODONE-ACETAMINOPHEN (PERCOCET) 5-325 MG PER TABLET    Take 1 Tab by mouth every four (4) hours as needed for Pain.  Max Daily Amount: 6 Tabs. Indications: PAIN    PREDNISONE (STERAPRED DS) 10 MG DOSE PACK    Take 6, 5, 4, 3, 2, and 1 tab po q a.m. On successive days    PROMETHAZINE (PHENERGAN) 25 MG TABLET    Take 1 Tab by mouth every six (6) hours as needed for Nausea.    These Medications have changed    No medications on file   Stop Taking    No medications on file

## 2018-02-14 NOTE — ED PROVIDER NOTES
EMERGENCY DEPARTMENT HISTORY AND PHYSICAL EXAM    5:06 AM      Date: 2/14/2018  Patient Name: Char Rizzo    History of Presenting Illness     Chief Complaint   Patient presents with    Chest Pain    Cough         History Provided By: Patient    Chief Complaint: Chest Pain  Duration: 1 Hour  Timing:  Constant  Location: Midline  Quality: Sharp and Tightness  Severity:   Modifying Factors: pain woke up from sleep  Associated Symptoms: cough      Additional History (Context): Char Rizzo is a 38 y.o. male with hx of HTN, glaucoma, and heart murmur who presents with c/o constant sharp and tight midline chest pain onset 1 hour that woke him from sleep lasting approximately 20 minutes. Pt states having a severe coughing episode at work yesterday almost to the point of emesis. Pt denies previous sx occurrence. Pt is a non-smoker. PCP: Shalini Franklin MD    Current Outpatient Prescriptions   Medication Sig Dispense Refill    losartan-hydroCHLOROthiazide (HYZAAR) 50-12.5 mg per tablet Take 1 Tab by mouth daily.  metoprolol tartrate (LOPRESSOR) 50 mg tablet Take  by mouth two (2) times a day.  lisinopril (PRINIVIL, ZESTRIL) 10 mg tablet Take  by mouth daily.  fluticasone (FLONASE) 50 mcg/actuation nasal spray 2 Sprays by Both Nostrils route daily. 1 Bottle 0    oxyCODONE IR (ROXICODONE) 10 mg tab immediate release tablet Take 1 Tab by mouth every six (6) hours as needed. Max Daily Amount: 40 mg. Indications: PAIN 44 Tab 0    oxyCODONE-acetaminophen (PERCOCET) 5-325 mg per tablet Take 1 Tab by mouth every four (4) hours as needed for Pain. Max Daily Amount: 6 Tabs. Indications: PAIN 64 Tab 0    promethazine (PHENERGAN) 25 mg tablet Take 1 Tab by mouth every six (6) hours as needed for Nausea.  20 Tab 0    acetaminophen-codeine (TYLENOL-CODEINE #3) 300-30 mg per tablet 1-2 tabs Qhs prn pain 60 Tab 0    albuterol (PROVENTIL HFA, VENTOLIN HFA, PROAIR HFA) 90 mcg/actuation inhaler Take 2 Puffs by inhalation every four (4) hours as needed for Wheezing or Shortness of Breath (or cough). 1 Inhaler 2    predniSONE (STERAPRED DS) 10 mg dose pack Take 6, 5, 4, 3, 2, and 1 tab po q a.m. On successive days 21 Tab 0       Past History     Past Medical History:  Past Medical History:   Diagnosis Date    Glaucoma     Heart murmur     Hypertension        Past Surgical History:  History reviewed. No pertinent surgical history. Family History:  Family History   Problem Relation Age of Onset    Asthma Mother     Cancer Neg Hx     Diabetes Neg Hx     Heart Disease Neg Hx     Hypertension Neg Hx     Stroke Neg Hx        Social History:  Social History   Substance Use Topics    Smoking status: Never Smoker    Smokeless tobacco: Never Used    Alcohol use No       Allergies:  No Known Allergies      Review of Systems       Review of Systems   Constitutional: Negative for fever. Eyes: Negative for visual disturbance. Respiratory: Positive for cough. Negative for shortness of breath. Cardiovascular: Positive for chest pain. Negative for leg swelling. Gastrointestinal: Negative for abdominal pain, blood in stool and vomiting. Genitourinary: Negative for dysuria and flank pain. Musculoskeletal: Negative for arthralgias and neck pain. Skin: Negative for rash. Neurological: Negative for headaches. Hematological: Does not bruise/bleed easily. Psychiatric/Behavioral: Negative for confusion. All other systems reviewed and are negative. Physical Exam     Visit Vitals    BP (!) 189/113 (BP 1 Location: Left arm, BP Patient Position: At rest)    Pulse 87    Temp 98.6 °F (37 °C)    Resp 16    Ht 6' 2\" (1.88 m)    Wt 121.1 kg (267 lb)    SpO2 98%    BMI 34.28 kg/m2         Physical Exam   Constitutional: He is oriented to person, place, and time. He appears well-developed and well-nourished. No distress. HENT:   Head: Normocephalic and atraumatic.    Right Ear: External ear normal.   Left Ear: External ear normal.   Nose: Nose normal.   Mouth/Throat: Oropharynx is clear and moist.   Eyes: Conjunctivae and EOM are normal. Pupils are equal, round, and reactive to light. No scleral icterus. Neck: Normal range of motion. Neck supple. No JVD present. No tracheal deviation present. No thyromegaly present. Cardiovascular: Normal rate, regular rhythm, normal heart sounds and intact distal pulses. Exam reveals no gallop and no friction rub. No murmur heard. Pulmonary/Chest: Effort normal and breath sounds normal. He exhibits no tenderness. Abdominal: Soft. Bowel sounds are normal. He exhibits no distension. There is no tenderness. There is no rebound and no guarding. Musculoskeletal: Normal range of motion. He exhibits no edema or tenderness. Lymphadenopathy:     He has no cervical adenopathy. Neurological: He is alert and oriented to person, place, and time. No cranial nerve deficit. Coordination normal.   No sensory loss, Gait normal, Motor 5/5   Skin: Skin is warm and dry. Psychiatric: He has a normal mood and affect. His behavior is normal. Judgment and thought content normal.   Nursing note and vitals reviewed.         Diagnostic Study Results     Labs -  Recent Results (from the past 12 hour(s))   EKG, 12 LEAD, INITIAL    Collection Time: 02/14/18  5:02 AM   Result Value Ref Range    Ventricular Rate 82 BPM    Atrial Rate 82 BPM    P-R Interval 164 ms    QRS Duration 94 ms    Q-T Interval 356 ms    QTC Calculation (Bezet) 415 ms    Calculated P Axis 48 degrees    Calculated R Axis 39 degrees    Calculated T Axis -6 degrees    Diagnosis       Normal sinus rhythm  Minimal voltage criteria for LVH, may be normal variant  Nonspecific T wave abnormality  Abnormal ECG  When compared with ECG of 04-JAN-2017 16:55,  No significant change was found     METABOLIC PANEL, BASIC    Collection Time: 02/14/18  5:10 AM   Result Value Ref Range    Sodium 141 136 - 145 mmol/L    Potassium 3.7 3.5 - 5.5 mmol/L    Chloride 103 100 - 108 mmol/L    CO2 30 21 - 32 mmol/L    Anion gap 8 3.0 - 18 mmol/L    Glucose 118 (H) 74 - 99 mg/dL    BUN 21 (H) 7.0 - 18 MG/DL    Creatinine 1.31 (H) 0.6 - 1.3 MG/DL    BUN/Creatinine ratio 16 12 - 20      GFR est AA >60 >60 ml/min/1.73m2    GFR est non-AA 59 (L) >60 ml/min/1.73m2    Calcium 9.1 8.5 - 10.1 MG/DL   CBC WITH AUTOMATED DIFF    Collection Time: 02/14/18  5:10 AM   Result Value Ref Range    WBC 5.5 4.6 - 13.2 K/uL    RBC 5.65 (H) 4.70 - 5.50 M/uL    HGB 14.1 13.0 - 16.0 g/dL    HCT 44.1 36.0 - 48.0 %    MCV 78.1 74.0 - 97.0 FL    MCH 25.0 24.0 - 34.0 PG    MCHC 32.0 31.0 - 37.0 g/dL    RDW 14.9 (H) 11.6 - 14.5 %    PLATELET 109 073 - 295 K/uL    MPV 11.6 9.2 - 11.8 FL    NEUTROPHILS 65 40 - 73 %    LYMPHOCYTES 16 (L) 21 - 52 %    MONOCYTES 12 (H) 3 - 10 %    EOSINOPHILS 6 (H) 0 - 5 %    BASOPHILS 1 0 - 2 %    ABS. NEUTROPHILS 3.6 1.8 - 8.0 K/UL    ABS. LYMPHOCYTES 0.9 0.9 - 3.6 K/UL    ABS. MONOCYTES 0.7 0.05 - 1.2 K/UL    ABS. EOSINOPHILS 0.4 0.0 - 0.4 K/UL    ABS. BASOPHILS 0.0 0.0 - 0.06 K/UL    DF AUTOMATED     TROPONIN I    Collection Time: 02/14/18  5:10 AM   Result Value Ref Range    Troponin-I, Qt. 0.34 (H) 0.00 - 0.06 NG/ML       Radiologic Studies -   XR CHEST PA LAT   Final Result   IMPRESSION:     No acute finding         Medical Decision Making   I am the first provider for this patient. I reviewed the vital signs, available nursing notes, past medical history, past surgical history, family history and social history. Vital Signs-Reviewed the patient's vital signs. Pulse Oximetry Analysis -  98% on room air (Interpretation) Normal    Cardiac Monitor:  Rate: 87 bpm  Rhythm:  Normal Sinus Rhythm     EKG: Interpreted by the EP.    Time Interpreted: 0502   Rate: 82 bpm   Rhythm: Normal Sinus Rhythm    Interpretation: No acute changes    Records Reviewed: Nursing Notes (Time of Review: 5:08 AM)    ED Course:  6:24 AM Consult: I discussed care with Dr. Fatuma Andrew (Hospitalist). It was a standard discussion including patient history, chief complaint, available diagnostic results, and predicted treatment course. Will admit      Provider Notes (Medical Decision Making): For Hospitalized Patients:    1. Hospitalization Decision Time:  The decision to hospitalize the patient was made by Dr. Higinio Bowling at 6:23 AM on 2/14/2018      Diagnosis     Clinical Impression: No diagnosis found. Disposition: Admit    Follow-up Information     None           Patient's Medications   Start Taking    No medications on file   Continue Taking    ACETAMINOPHEN-CODEINE (TYLENOL-CODEINE #3) 300-30 MG PER TABLET    1-2 tabs Qhs prn pain    ALBUTEROL (PROVENTIL HFA, VENTOLIN HFA, PROAIR HFA) 90 MCG/ACTUATION INHALER    Take 2 Puffs by inhalation every four (4) hours as needed for Wheezing or Shortness of Breath (or cough). FLUTICASONE (FLONASE) 50 MCG/ACTUATION NASAL SPRAY    2 Sprays by Both Nostrils route daily. LISINOPRIL (PRINIVIL, ZESTRIL) 10 MG TABLET    Take  by mouth daily. LOSARTAN-HYDROCHLOROTHIAZIDE (HYZAAR) 50-12.5 MG PER TABLET    Take 1 Tab by mouth daily. METOPROLOL TARTRATE (LOPRESSOR) 50 MG TABLET    Take  by mouth two (2) times a day. OXYCODONE IR (ROXICODONE) 10 MG TAB IMMEDIATE RELEASE TABLET    Take 1 Tab by mouth every six (6) hours as needed. Max Daily Amount: 40 mg. Indications: PAIN    OXYCODONE-ACETAMINOPHEN (PERCOCET) 5-325 MG PER TABLET    Take 1 Tab by mouth every four (4) hours as needed for Pain. Max Daily Amount: 6 Tabs. Indications: PAIN    PREDNISONE (STERAPRED DS) 10 MG DOSE PACK    Take 6, 5, 4, 3, 2, and 1 tab po q a.m. On successive days    PROMETHAZINE (PHENERGAN) 25 MG TABLET    Take 1 Tab by mouth every six (6) hours as needed for Nausea.    These Medications have changed    No medications on file   Stop Taking    No medications on file     _______________________________    Attestations:  Jaylene Attestation     Joe Connolly acting as a scribe for and in the presence of Christiana Stewart MD      February 14, 2018 at 5:08 AM       Provider Attestation:      I personally performed the services described in the documentation, reviewed the documentation, as recorded by the scribe in my presence, and it accurately and completely records my words and actions. February 14, 2018 at 5:08 AM - Christiana Stewart MD    _______________________________    Pt resting comfortably with no recurrence of CP, agrees with admit to [facility] for continued care.   Christiana Stewart MD  6:29 AM

## 2018-02-14 NOTE — PROCEDURES
CARDIAC CATHETERIZATION LABORATORY PROCEDURE REPORT    Blabina Wood is a 38 y.o. male    Medical Record Number: [id number]    Primary Care Provider: Colene Gowers, MD      Referral Provider: No ref. provider found    PROCEDURE DATE: 2/14/2018    INDICATIONS:   Unstable angina. MD PERFORMING PROCEDURE: [id number] Nelson Munoz MD    PROCEDURE:  Left heart catheterization, coronary angiography and left ventriculography. ANESTHESIA: Moderate sedation and local anesthesia. EBL: 10-50 ml  CONTRAST USE: Approximately 80 ml  RADIATION: None 84 mGy    Risks, benefits, and alternatives to the procedure were explained to the patient prior to the procedure. Questions were answered in detail. The patient appeared to understand and appropriate informed consent was obtained. The patient was brought to the cardiac catheterization  laboratory in a post-absorptive state and right wrist was prepped and draped in the usual sterile manner. Moderate sedation was achieved with a combination of Versed (midazolam) and Fentanyl. Lidocaine was used to secure local anesthesia. The right radial artery was cannulated using a Micro-puncture kit and a 6 German sheath was inserted. The patient received 3000 units of IV Heparin bolus as well as 5 mg Verapamil and 200 microgram NTG through the sheath to prevent arterial vasospasm. 6 [place] Rand[place] Laura Bowling catheter was used to obtain selective bilateral coronary angiograms, under fluoroscopic guidance,  in multiple projections. LV angiography was performed in the Guyanese-caudal projection with a hand injection; power injector was not available. There was concern that patient may have VSD on echocardiogram. Pressures were recorded in the LV and during pull back across the aortic valve. The following were observed. FLUOROSCOPY: There was no significant calcification. HEMODYNAMIC / ANGIOGRAPHIC DATA  · Left ventricle: End diastolic pressure was 15 mmHg.   Left ventriculography: The EF was estimated to be around 60 %. There was no diagnostic segmental wall motion abnormality. · Aorta: There was no significant systolic pressure gradient across the aortic valve. · Mitral valve: There was no mitral regurgitation. · Coronary Angiography:  · The coronary circulation was right dominant. · Left main: Angiographically normal.   · Left anterior descending: Angiographically normal.  · Ramus Intermediate: Angiographically normal.  · Diagonal Branches: Angiographically normal.  · Circumflex: Angiographically normal.  · Obtuse Marginal Branches: Angiographically normal.  · Right coronary: Angiographically normal.    The patient was transferred to the observation area with stable vital signs and in an asymptomatic state. The sheath will be pulled and hemostasis will be secured with the application of pressure. There was no apparent immediate complication. SUMMARY: Normal epicardial large coronary arteries. Normal LV function. No further coronary evaluation needed at this time. Moderate sedation was utilized for 15 minutes using Versed and fentanyl under my supervision. RECOMMENDATIONS:  Post-procedure care will focus on aggressive risk factor modification.      Electronically signed: Angel Jerez MD, [facility]

## 2018-02-14 NOTE — PROGRESS NOTES
Albuterol nebs was therapeutically interchanged for albuterol inhaler per the P&T Committee approved Therapeutic Interchanges Policy.     Elías Tarango [facility], Pharmacist  2/14/2018 6:03 PM

## 2018-02-14 NOTE — ED TRIAGE NOTES
Reports chest pain that awakened him this morning. States he started with a cough yesterday. Denies n/v or SOB.

## 2018-02-14 NOTE — PROGRESS NOTES
TRANSFER - OUT REPORT:    Verbal report given to Ronel Malik RN  on Deepthi Webb  being transferred to  for ordered procedure       Report consisted of patients Situation, Background, Assessment and   Recommendations(SBAR). Information from the following report(s) SBAR, Procedure Summary and MAR was reviewed with the receiving nurse. Lines:   Peripheral IV 02/14/18 Right Antecubital (Active)   Site Assessment Clean, dry, & intact 2/14/2018  5:15 AM   Phlebitis Assessment 0 2/14/2018  5:15 AM   Infiltration Assessment 0 2/14/2018  5:15 AM   Dressing Status Clean, dry, & intact 2/14/2018  5:15 AM   Dressing Type Tape;Transparent 2/14/2018  5:15 AM   Hub Color/Line Status Flushed;Patent 2/14/2018  5:15 AM   Action Taken Blood drawn 2/14/2018  5:15 AM       Peripheral IV 02/14/18 Left Antecubital (Active)   Dressing Type Transparent 2/14/2018  9:44 AM   Hub Color/Line Status Flushed 2/14/2018  9:44 AM   Action Taken Blood drawn 2/14/2018  9:44 AM        Opportunity for questions and clarification was provided.       Patient transported with:   [facility]

## 2018-02-14 NOTE — ED NOTES
Accepted patient in transfer from [facility] as patient needs to go for cardiac catheterization and no beds are currently available in the hospital. Pt was seen on arrival by the hospitalist. I evaluated him briefly and he notes that he is feeling better. Scribe Attestation     Marty Hess acting as a scribe for and in the presence of Jose Antonio Sanchez MD      February 14, 2018 at 12:23 PM       Provider Attestation:      I personally performed the services described in the documentation, reviewed the documentation, as recorded by the scribe in my presence, and it accurately and completely records my words and actions.  February 14, 2018 at 12:23 PM - Jose Antonio Sanchez MD

## 2018-02-14 NOTE — ED NOTES
Bedside and Verbal shift change report given to Grey Hargrove RN (oncoming nurse) by Richa Bernal RN   (offgoing nurse). Report included the following information SBAR, Kardex, MAR, Recent Results and Cardiac Rhythm - Normal Sinus Rhythm.

## 2018-02-14 NOTE — IP AVS SNAPSHOT
[address] Patient: Char Rizzo MRN: [id number] About your hospitalization You were admitted on:  2018 You last received care in the:  [address] [place] [address] You were discharged on:  February 15, 2018 Why you were hospitalized Your primary diagnosis was:  Not on File Your diagnoses also included:  Elevated Troponin, Htn (Hypertension), Chest Pain Follow-up Information Follow up With Details Comments Contact Info MD Dana   [address] [place] [address] 
[phone] Discharge Orders None A check joshua indicates which time of day the medication should be taken. My Medications CHANGE how you take these medications Instructions Each Dose to Equal  
 Morning Noon Evening Bedtime  
 lisinopril 10 mg tablet Commonly known as:  Nasreen Abad What changed:  how much to take Your last dose was: Your next dose is: Take 1 Tab by mouth daily. 10 mg  
    
   
   
   
  
 metoprolol tartrate 50 mg tablet Commonly known as:  LOPRESSOR What changed:  how much to take Your last dose was: Your next dose is: Take 1 Tab by mouth two (2) times a day. 50 mg CONTINUE taking these medications Instructions Each Dose to Equal  
 Morning Noon Evening Bedtime  
 acetaminophen-codeine 300-30 mg per tablet Commonly known as:  TYLENOL-CODEINE #3 Your last dose was: Your next dose is:    
   
   
 1-2 tabs Qhs prn pain  
     
   
   
   
  
 albuterol 90 mcg/actuation inhaler Commonly known as:  PROVENTIL HFA, VENTOLIN HFA, PROAIR HFA Your last dose was: Your next dose is: Take 2 Puffs by inhalation every four (4) hours as needed for Wheezing or Shortness of Breath (or cough). 2 Puff fluticasone 50 mcg/actuation nasal spray Commonly known as:  Khoa Peralta Your last dose was: Your next dose is: 2 Sprays by Both Nostrils route daily. 2 [place] STOP taking these medications   
 losartan-hydroCHLOROthiazide 50-12.5 mg per tablet Commonly known as:  HYZAAR  
   
  
 oxyCODONE IR 10 mg Tab immediate release tablet Commonly known as:  ROXICODONE  
   
  
 oxyCODONE-acetaminophen 5-325 mg per tablet Commonly known as:  PERCOCET  
   
  
 predniSONE 10 mg dose pack Commonly known as:  STERAPRED DS  
   
  
 promethazine 25 mg tablet Commonly known as:  PHENERGAN Where to Get Your Medications Information on where to get these meds will be given to you by the nurse or doctor. ! Ask your nurse or doctor about these medications  
  lisinopril 10 mg tablet  
 metoprolol tartrate 50 mg tablet Discharge Instructions Chest Pain: Care Instructions Your Care Instructions There are many things that can cause chest pain. Some are not serious and will get better on their own in a few days. But some kinds of chest pain need more testing and treatment. Your doctor may have recommended a follow-up visit in the next 8 to 12 hours. If you are not getting better, you may need more tests or treatment. Even though your doctor has released you, you still need to watch for any problems. The doctor carefully checked you, but sometimes problems can develop later. If you have new symptoms or if your symptoms do not get better, get medical care right away. If you have worse or different chest pain or pressure that lasts more than 5 minutes or you passed out (lost consciousness), call 911 or seek other emergency help right away. A medical visit is only one step in your treatment.  Even if you feel better, you still need to do what your doctor recommends, such as going to all suggested follow-up appointments and taking medicines exactly as directed. This will help you recover and help prevent future problems. How can you care for yourself at home? · Rest until you feel better. · Take your medicine exactly as prescribed. Call your doctor if you think you are having a problem with your medicine. · Do not drive after taking a prescription pain medicine. When should you call for help? Call 911 if: 
? · You passed out (lost consciousness). ? · You have severe difficulty breathing. ? · You have symptoms of a heart attack. These may include: ¨ Chest pain or pressure, or a strange feeling in your chest. 
¨ Sweating. ¨ Shortness of breath. ¨ Nausea or vomiting. ¨ Pain, pressure, or a strange feeling in your back, neck, jaw, or upper belly or in one or both shoulders or arms. ¨ Lightheadedness or sudden weakness. ¨ A fast or irregular heartbeat. After you call 911, the  may tell you to chew 1 adult-strength or 2 to 4 low-dose aspirin. Wait for an ambulance. Do not try to drive yourself. ?Call your doctor today if: 
? · You have any trouble breathing. ? · Your chest pain gets worse. ? · You are dizzy or lightheaded, or you feel like you may faint. ? · You are not getting better as expected. ? · You are having new or different chest pain. Where can you learn more? Go to http://lay-arminda.info/. Enter A120 in the search box to learn more about \"Chest Pain: Care Instructions. \" Current as of: March 20, 2017 Content Version: 11.4 © 3697-2894 [employer]. Care instructions adapted under license by [employer] (which disclaims liability or warranty for this information). If you have questions about a medical condition or this instruction, always ask your healthcare professional. [address] any warranty or liability for your use of this information. Musculoskeletal Chest Pain: Care Instructions Your Care Instructions Chest pain is not always a sign that something is wrong with your heart or that you have another serious problem. The doctor thinks your chest pain is caused by strained muscles or ligaments, inflamed chest cartilage, or another problem in your chest, rather than by your heart. You may need more tests to find the cause of your chest pain. Follow-up care is a key part of your treatment and safety. Be sure to make and go to all appointments, and call your doctor if you are having problems. It's also a good idea to know your test results and keep a list of the medicines you take. How can you care for yourself at home? · Take pain medicines exactly as directed. ¨ If the doctor gave you a prescription medicine for pain, take it as prescribed. ¨ If you are not taking a prescription pain medicine, ask your doctor if you can take an over-the-counter medicine. · Rest and protect the sore area. · Stop, change, or take a break from any activity that may be causing your pain or soreness. · Put ice or a cold pack on the sore area for 10 to 20 minutes at a time. Try to do this every 1 to 2 hours for the next 3 days (when you are awake) or until the swelling goes down. Put a thin cloth between the ice and your skin. · After 2 or 3 days, apply a heating pad set on low or a warm cloth to the area that hurts. Some doctors suggest that you go back and forth between hot and cold. · Do not wrap or tape your ribs for support. This may cause you to take smaller breaths, which could increase your risk of lung problems. · Mentholated creams such as Bengay or Icy Hot may soothe sore muscles. Follow the instructions on the package. · Follow your doctor's instructions for exercising. · Gentle stretching and massage may help you get better faster.  Stretch slowly to the point just before pain begins, and hold the stretch for at least 15 to 30 seconds. Do this 3 or 4 times a day. Stretch just after you have applied heat. · As your pain gets better, slowly return to your normal activities. Any increased pain may be a sign that you need to rest a while longer. When should you call for help? Call 911 anytime you think you may need emergency care. For example, call if: 
? · You have chest pain or pressure. This may occur with: ¨ Sweating. ¨ Shortness of breath. ¨ Nausea or vomiting. ¨ Pain that spreads from the chest to the neck, jaw, or one or both shoulders or arms. ¨ Dizziness or lightheadedness. ¨ A fast or uneven pulse. After calling 911, chew 1 adult-strength aspirin. Wait for an ambulance. Do not try to drive yourself. ? · You have sudden chest pain and shortness of breath, or you cough up blood. ?Call your doctor now or seek immediate medical care if: 
? · You have any trouble breathing. ? · Your chest pain gets worse. ? · Your chest pain occurs consistently with exercise and is relieved by rest. ? Watch closely for changes in your health, and be sure to contact your doctor if: 
? · Your chest pain does not get better after 1 week. Where can you learn more? Go to http://lay-arminda.info/. Enter V293 in the search box to learn more about \"Musculoskeletal Chest Pain: Care Instructions. \" Current as of: March 20, 2017 Content Version: 11.4 © 6534-3471 Styloola. Care instructions adapted under license by [employer] (which disclaims liability or warranty for this information). If you have questions about a medical condition or this instruction, always ask your healthcare professional. [place] [address] any warranty or liability for your use of this information. High Blood Pressure: Care Instructions Your Care Instructions If your blood pressure is usually above 140/90, you have high blood pressure, or hypertension. That means the top number is 140 or higher or the bottom number is 90 or higher, or both. Despite what a lot of people think, high blood pressure usually doesn't cause headaches or make you feel dizzy or lightheaded. It usually has no symptoms. But it does increase your risk for heart attack, stroke, and kidney or eye damage. The higher your blood pressure, the more your risk increases. Your doctor will give you a goal for your blood pressure. Your goal will be based on your health and your age. An example of a goal is to keep your blood pressure below 140/90. Lifestyle changes, such as eating healthy and being active, are always important to help lower blood pressure. You might also take medicine to reach your blood pressure goal. 
Follow-up care is a key part of your treatment and safety. Be sure to make and go to all appointments, and call your doctor if you are having problems. It's also a good idea to know your test results and keep a list of the medicines you take. How can you care for yourself at home? Medical treatment · If you stop taking your medicine, your blood pressure will go back up. You may take one or more types of medicine to lower your blood pressure. Be safe with medicines. Take your medicine exactly as prescribed. Call your doctor if you think you are having a problem with your medicine. · Talk to your doctor before you start taking aspirin every day. Aspirin can help certain people lower their risk of a heart attack or stroke. But taking aspirin isn't right for everyone, because it can cause serious bleeding. · See your doctor regularly. You may need to see the doctor more often at first or until your blood pressure comes down. · If you are taking blood pressure medicine, talk to your doctor before you take decongestants or anti-inflammatory medicine, such as ibuprofen. Some of these medicines can raise blood pressure. · Learn how to check your blood pressure at home. Lifestyle changes · Stay at a healthy weight. This is especially important if you put on weight around the waist. Losing even 10 pounds can help you lower your blood pressure. · If your doctor recommends it, get more exercise. Walking is a good choice. Bit by bit, increase the amount you walk every day. Try for at least 30 minutes on most days of the week. You also may want to swim, bike, or do other activities. · Avoid or limit alcohol. Talk to your doctor about whether you can drink any alcohol. · Try to limit how much sodium you eat to less than 2,300 milligrams (mg) a day. Your doctor may ask you to try to eat less than 1,500 mg a day. · Eat plenty of fruits (such as bananas and oranges), vegetables, legumes, whole grains, and low-fat dairy products. · Lower the amount of saturated fat in your diet. Saturated fat is found in animal products such as milk, cheese, and meat. Limiting these foods may help you lose weight and also lower your risk for heart disease. · Do not smoke. Smoking increases your risk for heart attack and stroke. If you need help quitting, talk to your doctor about stop-smoking programs and medicines. These can increase your chances of quitting for good. When should you call for help? Call 911 anytime you think you may need emergency care. This may mean having symptoms that suggest that your blood pressure is causing a serious heart or blood vessel problem. Your blood pressure may be over 180/110. ? For example, call 911 if: 
? · You have symptoms of a heart attack. These may include: ¨ Chest pain or pressure, or a strange feeling in the chest. 
¨ Sweating. ¨ Shortness of breath. ¨ Nausea or vomiting. ¨ Pain, pressure, or a strange feeling in the back, neck, jaw, or upper belly or in one or both shoulders or arms. ¨ Lightheadedness or sudden weakness. ¨ A fast or irregular heartbeat. ? · You have symptoms of a stroke. These may include: 
¨ Sudden numbness, tingling, weakness, or loss of movement in your face, arm, or leg, especially on only one side of your body. ¨ Sudden vision changes. ¨ Sudden trouble speaking. ¨ Sudden confusion or trouble understanding simple statements. ¨ Sudden problems with walking or balance. ¨ A sudden, severe headache that is different from past headaches. ? · You have severe back or belly pain. ?Do not wait until your blood pressure comes down on its own. Get help right away. ?Call your doctor now or seek immediate care if: 
? · Your blood pressure is much higher than normal (such as 180/110 or higher), but you don't have symptoms. ? · You think high blood pressure is causing symptoms, such as: ¨ Severe headache. ¨ Blurry vision. ? Watch closely for changes in your health, and be sure to contact your doctor if: 
? · Your blood pressure measures 140/90 or higher at least 2 times. That means the top number is 140 or higher or the bottom number is 90 or higher, or both. ? · You think you may be having side effects from your blood pressure medicine. ? · Your blood pressure is usually normal, but it goes above normal at least 2 times. Where can you learn more? Go to http://lay-arminda.info/. Enter B091 in the search box to learn more about \"High Blood Pressure: Care Instructions. \" Current as of: September 21, 2016 Content Version: 11.4 © 5110-2515 GlobeSherpa. Care instructions adapted under license by [employer] (which disclaims liability or warranty for this information). If you have questions about a medical condition or this instruction, always ask your healthcare professional. [place] [address] any warranty or liability for your use of this information. DISCHARGE SUMMARY from Nurse PATIENT INSTRUCTIONS: 
 
 
F-face looks uneven A-arms unable to move or move unevenly S-speech slurred or non-existent T-time-call 911 as soon as signs and symptoms begin-DO NOT go Back to bed or wait to see if you get better-TIME IS BRAIN. Warning Signs of HEART ATTACK Call 911 if you have these symptoms: 
? Chest discomfort. Most heart attacks involve discomfort in the center of the chest that lasts more than a few minutes, or that goes away and comes back. It can feel like uncomfortable pressure, squeezing, fullness, or pain. ? Discomfort in other areas of the upper body. Symptoms can include pain or discomfort in one or both arms, the back, neck, jaw, or stomach. ? Shortness of breath with or without chest discomfort. ? Other signs may include breaking out in a cold sweat, nausea, or lightheadedness. Don't wait more than five minutes to call [address]! Fast action can save your life. Calling 911 is almost always the fastest way to get lifesaving treatment. Emergency Medical Services staff can begin treatment when they arrive  up to an hour sooner than if someone gets to the hospital by car. The discharge information has been reviewed with the patient. The patient verbalized understanding. Discharge medications reviewed with the patient and appropriate educational materials and side effects teaching were provided. ___________________________________________________________________________________________________________________________________ Patient armband removed and shredded MyChart Announcement  We are excited to announce that we are making your provider's discharge notes available to you in Contextool. You will see these notes when they are completed and signed by the physician that discharged you from your recent hospital stay. If you have any questions or concerns about any information you see in Contextool, please call the Health Information Department where you were seen or reach out to your Primary Care Provider for more information about your plan of care. Introducing [facility]! [employer] introduces Contextool patient portal. Now you can access parts of your medical record, email your doctor's office, and request medication refills online. 1. In your internet browser, go to [email or web address] 2. Click on the First Time User? Click Here link in the Sign In box. You will see the New Member Sign Up page. 3. Enter your Contextool Access Code exactly as it appears below. You will not need to use this code after youve completed the sign-up process. If you do not sign up before the expiration date, you must request a new code. · Contextool Access Code: [email or web address] Expires: 2/20/2018  1:45 PM 
 
4. Enter the last four digits of your Social Security Number (xxxx) and Date of Birth (mm/dd/yyyy) as indicated and click Submit. You will be taken to the next sign-up page. 5. Create a Contextool ID. This will be your Contextool login ID and cannot be changed, so think of one that is secure and easy to remember. 6. Create a Contextool password. You can change your password at any time. 7. Enter your Password Reset Question and Answer. This can be used at a later time if you forget your password. 8. Enter your e-mail address. You will receive e-mail notification when new information is available in [address] 9. Click Sign Up. You can now view and download portions of your medical record. 10. Click the Download Summary menu link to download a portable copy of your medical information. If you have questions, please visit the Frequently Asked Questions section of the MyChart website. Remember, MyChart is NOT to be used for urgent needs. For medical emergencies, dial 911. Now available from your iPhone and Android! Unresulted Labs-Please follow up with your PCP about these lab tests Order Current Status CULTURE, BLOOD In process CULTURE, BLOOD In process Providers Seen During Your Hospitalization Provider Specialty Primary office phone Eric Cam MD Emergency Medicine [phone] Virginia Rashid MD Emergency Medicine [phone] Barrett Pedroza MD Internal Medicine [phone] Regi Diaz MD Cardiology [phone] Minh Huber MD Internal Medicine [phone] Immunizations Administered for This Admission Name Date Influenza Vaccine (Quad) PF 2/14/2018 Your Primary Care Physician (PCP) Primary Care Physician Office Phone Office Fax Mario Side [phone] You are allergic to the following No active allergies Recent Documentation Height Weight BMI Smoking Status 1.88 m 122.9 kg 34.78 kg/m2 Never Smoker Emergency Contacts Name Discharge Info Relation Home Work Mobile Carlos-Rani Olsen DISCHARGE CAREGIVER [3] Spouse [3]   [phone] [address] CAREGIVER [3] Mother [14] [phone] Rani Gonzalez  Spouse [3] [phone] Patient Belongings The following personal items are in your possession at time of discharge: 
  Dental Appliances: None  Visual Aid: None      Home Medications: None   Jewelry: None  Clothing: None    Other Valuables: None Please provide this summary of care documentation to your next provider. Signatures-by signing, you are acknowledging that this After Visit Summary has been reviewed with you and you have received a copy.   
  
 
  
    
    
 Patient Signature: ____________________________________________________________ Date:  ____________________________________________________________  
  
Naya Must Provider Signature:  ____________________________________________________________ Date:  ____________________________________________________________

## 2018-02-15 VITALS
DIASTOLIC BLOOD PRESSURE: 94 MMHG | RESPIRATION RATE: 18 BRPM | SYSTOLIC BLOOD PRESSURE: 143 MMHG | HEART RATE: 81 BPM | HEIGHT: 74 IN | TEMPERATURE: 101.3 F | BODY MASS INDEX: 34.77 KG/M2 | WEIGHT: 270.9 LBS | OXYGEN SATURATION: 94 %

## 2018-02-15 LAB
APTT PPP: 37.3 SEC (ref 23–36.4)
APTT PPP: 37.7 SEC (ref 23–36.4)
CHOLEST SERPL-MCNC: 147 MG/DL
HDLC SERPL-MCNC: 39 MG/DL (ref 40–60)
HDLC SERPL: 3.8 {RATIO} (ref 0–5)
LDLC SERPL CALC-MCNC: 91.4 MG/DL (ref 0–100)
LIPID PROFILE,FLP: ABNORMAL
TRIGL SERPL-MCNC: 83 MG/DL (ref ?–150)
VLDLC SERPL CALC-MCNC: 16.6 MG/DL

## 2018-02-15 PROCEDURE — 85730 THROMBOPLASTIN TIME PARTIAL: CPT | Performed by: EMERGENCY MEDICINE

## 2018-02-15 PROCEDURE — 74011250637 HC RX REV CODE- 250/637: Performed by: HOSPITALIST

## 2018-02-15 PROCEDURE — 87040 BLOOD CULTURE FOR BACTERIA: CPT | Performed by: HOSPITALIST

## 2018-02-15 PROCEDURE — 80061 LIPID PANEL: CPT | Performed by: HOSPITALIST

## 2018-02-15 PROCEDURE — 36415 COLL VENOUS BLD VENIPUNCTURE: CPT | Performed by: EMERGENCY MEDICINE

## 2018-02-15 RX ORDER — LISINOPRIL 10 MG/1
10 TABLET ORAL DAILY
Qty: 30 TAB | Refills: 1 | Status: ON HOLD | OUTPATIENT
Start: 2018-02-15 | End: 2019-08-16

## 2018-02-15 RX ORDER — METOPROLOL TARTRATE 50 MG/1
50 TABLET ORAL 2 TIMES DAILY
Qty: 60 TAB | Refills: 1 | Status: ON HOLD | OUTPATIENT
Start: 2018-02-15 | End: 2019-08-16

## 2018-02-15 RX ADMIN — LISINOPRIL 10 MG: 10 TABLET ORAL at 09:47

## 2018-02-15 RX ADMIN — ACETAMINOPHEN 650 MG: 325 TABLET ORAL at 05:18

## 2018-02-15 RX ADMIN — Medication 10 ML: at 05:22

## 2018-02-15 RX ADMIN — FLUTICASONE PROPIONATE 2 SPRAY: 50 SPRAY, METERED NASAL at 09:55

## 2018-02-15 RX ADMIN — METOPROLOL TARTRATE 50 MG: 50 TABLET ORAL at 09:47

## 2018-02-15 RX ADMIN — ACETAMINOPHEN 650 MG: 325 TABLET ORAL at 13:16

## 2018-02-15 NOTE — PROGRESS NOTES
conducted an initial consultation and Spiritual Assessment for Vivi Steven, who is a 38 y.o.,male. Patients Primary Language is: [place]. According to the patients EMR Taoist Affiliation is: [facility].     The reason the Patient came to the hospital is:   Patient Active Problem List    Diagnosis Date Noted    Elevated troponin 02/14/2018    HTN (hypertension) 02/14/2018    Chest pain 02/14/2018        The  provided the following Interventions:  Initiated a relationship of care and support. Inquired as to issues of patricia, belief, spirituality and Restoration/ritual needs while hospitalized. Listened empathically. Provided information about Spiritual Care Services. Offered prayer on patient's behalf. Chart reviewed. The following outcomes where achieved:  Patient processed feeling about current hospitalization. Patient expressed gratitude for 's visit. Assessment:  Patient and family's relationship appeared to be caring and supportive. Patient and family were open to and grateful for prayer; did not express further needs. Plan:  Chaplains will continue to follow and will provide pastoral care on an as needed/requested basis.  recommends bedside caregivers page  on duty if patient shows signs of acute spiritual or emotional distress. Meaghan Guajardo.  [facility] [phone]

## 2018-02-15 NOTE — DISCHARGE SUMMARY
[facility]  Discharge Summary       Patient: Homer Parmar Age: 38 y.o. : 1975 MR#: [id number] SSN: [id number]  PCP on record: Leanne Storm MD  Admit date: 2018  Discharge date: 2/15/2018    Consults: Dr Stewart-cardiology   -   Procedures: cardiac cath 18:    -   SUMMARY: Normal epicardial large coronary arteries. Normal LV function. No further coronary evaluation needed at this time. Moderate sedation was utilized for 15 minutes using Versed and fentanyl under my supervision. Significant Diagnostic Studies: cardiac echo 18:  - Left ventricle: Systolic function was normal. Ejection fraction was estimated   in the range of 55 % to 60 %. There was  possible hypokinesis of the basal inferior wall(s). There was mild concentric   hypertrophy. Discharge Diagnoses: Atypical chest pain  Fever cause unknown                                          Patient Active Problem List   Diagnosis Code    Elevated troponin R74.8    HTN (hypertension) I10    Chest pain R07.9       Hospital Course by Problem     38year old male who presented w/ one time episode of sharp cp that woke him up from sleep w/o associated symptoms. Underwent cardiac cath b/c of some elevation in trop and echo indicating possible wall motion abnormalities. Cardiac cath did not show evidence of significant coronary vessel disease. He states he does not always take his bp meds and his bp was initially elevated. He was started on ace i and bb, his bp improved. On date of dc he reported no cp. He was noted to have fever episodes. Pt denied having dysuria, diarrhea, body aches, sick contact w/ flu. Had cough recently but chest x ray did not show anything acute. Cause of fever at this time is unclear. I have instructed pt to call his MD if he has any symptoms that develop along with his fever. He does not have any other evidence of sepsis.  At this time no indication for inpatient management. Today's examination of the patient revealed:     Subjective:   Pt has no complaints  Objective:   VS:   Visit Vitals    BP (!) 143/94 (BP 1 Location: Right arm, BP Patient Position: At rest)    Pulse 81    Temp (!) 101.3 °F (38.5 °C)    Resp 18    Ht 6' 2\" (1.88 m)    Wt 122.9 kg (270 lb 14.4 oz)    SpO2 94%    BMI 34.78 kg/m2      Tmax/24hrs: Temp (24hrs), Av.6 °F (38.7 °C), Min:99.6 °F (37.6 °C), Max:104.1 °F (40.1 °C)     Input/Output:   Intake/Output Summary (Last 24 hours) at 02/15/18 1425  Last data filed at 02/15/18 0551   Gross per 24 hour   Intake              380 ml   Output                0 ml   Net              380 ml       General: alert, awake, in nad   Cardiovascular:  Rrr, no murmurs  Pulmonary: ctab   GI:  Soft, nt, nd  Extremities:no edema   Additional:      Labs:    Recent Results (from the past 24 hour(s))   POC ACTIVATED CLOTTING TIME    Collection Time: 18  2:47 PM   Result Value Ref Range    Activated Clotting Time (POC) 153 (H) 79 - 750 SECS   METABOLIC PANEL, BASIC    Collection Time: 18  8:01 PM   Result Value Ref Range    Sodium 137 136 - 145 mmol/L    Potassium 3.4 (L) 3.5 - 5.5 mmol/L    Chloride 102 100 - 108 mmol/L    CO2 27 21 - 32 mmol/L    Anion gap 8 3.0 - 18 mmol/L    Glucose 103 (H) 74 - 99 mg/dL    BUN 16 7.0 - 18 MG/DL    Creatinine 1.41 (H) 0.6 - 1.3 MG/DL    BUN/Creatinine ratio 11 (L) 12 - 20      GFR est AA >60 >60 ml/min/1.73m2    GFR est non-AA 55 (L) >60 ml/min/1.73m2    Calcium 8.6 8.5 - 10.1 MG/DL   HEPATIC FUNCTION PANEL    Collection Time: 18  8:01 PM   Result Value Ref Range    Protein, total 7.4 6.4 - 8.2 g/dL    Albumin 3.5 3.4 - 5.0 g/dL    Globulin 3.9 2.0 - 4.0 g/dL    A-G Ratio 0.9 0.8 - 1.7      Bilirubin, total 0.5 0.2 - 1.0 MG/DL    Bilirubin, direct 0.1 0.0 - 0.2 MG/DL    Alk.  phosphatase 23 (L) 45 - 117 U/L    AST (SGOT) 22 15 - 37 U/L    ALT (SGPT) 25 16 - 61 U/L   TSH 3RD GENERATION    Collection Time: 02/14/18  8:01 PM   Result Value Ref Range    TSH 0.49 0.36 - 3.74 uIU/mL   CBC W/O DIFF    Collection Time: 02/14/18  8:01 PM   Result Value Ref Range    WBC 6.3 4.6 - 13.2 K/uL    RBC 5.05 4.70 - 5.50 M/uL    HGB 13.1 13.0 - 16.0 g/dL    HCT 38.8 36.0 - 48.0 %    MCV 76.8 74.0 - 97.0 FL    MCH 25.9 24.0 - 34.0 PG    MCHC 33.8 31.0 - 37.0 g/dL    RDW 14.6 (H) 11.6 - 14.5 %    PLATELET 699 586 - 252 K/uL    MPV 11.4 9.2 - 11.8 FL   TROPONIN I    Collection Time: 02/14/18  8:01 PM   Result Value Ref Range    Troponin-I, Qt. 0.33 (H) 0.0 - 0.045 NG/ML   PROTHROMBIN TIME + INR    Collection Time: 02/14/18  8:01 PM   Result Value Ref Range    Prothrombin time 14.8 11.5 - 15.2 sec    INR 1.2 0.8 - 1.2     PTT    Collection Time: 02/14/18  8:01 PM   Result Value Ref Range    aPTT 34.1 23.0 - 36.4 SEC   PTT    Collection Time: 02/15/18  5:40 AM   Result Value Ref Range    aPTT 37.3 (H) 23.0 - 36.4 SEC   LIPID PANEL    Collection Time: 02/15/18  5:40 AM   Result Value Ref Range    LIPID PROFILE          Cholesterol, total 147 <200 MG/DL    Triglyceride 83 <150 MG/DL    HDL Cholesterol 39 (L) 40 - 60 MG/DL    LDL, calculated 91.4 0 - 100 MG/DL    VLDL, calculated 16.6 MG/DL    CHOL/HDL Ratio 3.8 0 - 5.0     PTT    Collection Time: 02/15/18 10:25 AM   Result Value Ref Range    aPTT 37.7 (H) 23.0 - 36.4 SEC     Additional Data Reviewed:     Condition:   Disposition:    [x]Home   []Home with Home Health   []SNF/NH   []Rehab   []Home with family   []Alternate Facility:____________________      Discharge Medications:     Current Discharge Medication List      CONTINUE these medications which have CHANGED    Details   lisinopril (PRINIVIL, ZESTRIL) 10 mg tablet Take 1 Tab by mouth daily. Qty: 30 Tab, Refills: 1      metoprolol tartrate (LOPRESSOR) 50 mg tablet Take 1 Tab by mouth two (2) times a day.   Qty: 60 Tab, Refills: 1         CONTINUE these medications which have NOT CHANGED    Details   fluticasone (FLONASE) 50 mcg/actuation nasal spray 2 Sprays by Both Nostrils route daily. Qty: 1 Bottle, Refills: 0      acetaminophen-codeine (TYLENOL-CODEINE #3) 300-30 mg per tablet 1-2 tabs Qhs prn pain  Qty: 60 Tab, Refills: 0    Associated Diagnoses: Tear of medial meniscus of left knee, current, unspecified tear type, subsequent encounter      albuterol (PROVENTIL HFA, VENTOLIN HFA, PROAIR HFA) 90 mcg/actuation inhaler Take 2 Puffs by inhalation every four (4) hours as needed for Wheezing or Shortness of Breath (or cough). Qty: 1 Inhaler, Refills: 2         STOP taking these medications       losartan-hydroCHLOROthiazide (HYZAAR) 50-12.5 mg per tablet Comments:   Reason for Stopping:         oxyCODONE IR (ROXICODONE) 10 mg tab immediate release tablet Comments:   Reason for Stopping:         oxyCODONE-acetaminophen (PERCOCET) 5-325 mg per tablet Comments:   Reason for Stopping:         promethazine (PHENERGAN) 25 mg tablet Comments:   Reason for Stopping:         predniSONE (STERAPRED DS) 10 mg dose pack Comments:   Reason for Stopping: Follow-up Appointments:   1.  Your PCP: MD Dana, within 7-10days           >30 minutes spent coordinating this discharge (review instructions/follow-up, prescriptions, preparing report for sign off)    Signed:  Colonel Eddi MD  2/15/2018  2:25 PM

## 2018-02-15 NOTE — DISCHARGE INSTRUCTIONS
Chest Pain: Care Instructions  Your Care Instructions    There are many things that can cause chest pain. Some are not serious and will get better on their own in a few days. But some kinds of chest pain need more testing and treatment. Your doctor may have recommended a follow-up visit in the next 8 to 12 hours. If you are not getting better, you may need more tests or treatment. Even though your doctor has released you, you still need to watch for any problems. The doctor carefully checked you, but sometimes problems can develop later. If you have new symptoms or if your symptoms do not get better, get medical care right away. If you have worse or different chest pain or pressure that lasts more than 5 minutes or you passed out (lost consciousness), call 911 or seek other emergency help right away. A medical visit is only one step in your treatment. Even if you feel better, you still need to do what your doctor recommends, such as going to all suggested follow-up appointments and taking medicines exactly as directed. This will help you recover and help prevent future problems. How can you care for yourself at home? · Rest until you feel better. · Take your medicine exactly as prescribed. Call your doctor if you think you are having a problem with your medicine. · Do not drive after taking a prescription pain medicine. When should you call for help? Call 911 if:  ? · You passed out (lost consciousness). ? · You have severe difficulty breathing. ? · You have symptoms of a heart attack. These may include:  ¨ Chest pain or pressure, or a strange feeling in your chest.  ¨ Sweating. ¨ Shortness of breath. ¨ Nausea or vomiting. ¨ Pain, pressure, or a strange feeling in your back, neck, jaw, or upper belly or in one or both shoulders or arms. ¨ Lightheadedness or sudden weakness. ¨ A fast or irregular heartbeat.   After you call 911, the  may tell you to chew 1 adult-strength or 2 to 4 low-dose aspirin. Wait for an ambulance. Do not try to drive yourself. ?Call your doctor today if:  ? · You have any trouble breathing. ? · Your chest pain gets worse. ? · You are dizzy or lightheaded, or you feel like you may faint. ? · You are not getting better as expected. ? · You are having new or different chest pain. Where can you learn more? Go to http://lay-arminda.info/. Enter A120 in the search box to learn more about \"Chest Pain: Care Instructions. \"  Current as of: March 20, 2017  Content Version: 11.4  © 1258-4919 [employer]. Care instructions adapted under license by [employer] (which disclaims liability or warranty for this information). If you have questions about a medical condition or this instruction, always ask your healthcare professional. [place] [address] any warranty or liability for your use of this information. Musculoskeletal Chest Pain: Care Instructions  Your Care Instructions    Chest pain is not always a sign that something is wrong with your heart or that you have another serious problem. The doctor thinks your chest pain is caused by strained muscles or ligaments, inflamed chest cartilage, or another problem in your chest, rather than by your heart. You may need more tests to find the cause of your chest pain. Follow-up care is a key part of your treatment and safety. Be sure to make and go to all appointments, and call your doctor if you are having problems. It's also a good idea to know your test results and keep a list of the medicines you take. How can you care for yourself at home? · Take pain medicines exactly as directed. ¨ If the doctor gave you a prescription medicine for pain, take it as prescribed. ¨ If you are not taking a prescription pain medicine, ask your doctor if you can take an over-the-counter medicine. · Rest and protect the sore area.   · Stop, change, or take a break from any activity that may be causing your pain or soreness. · Put ice or a cold pack on the sore area for 10 to 20 minutes at a time. Try to do this every 1 to 2 hours for the next 3 days (when you are awake) or until the swelling goes down. Put a thin cloth between the ice and your skin. · After 2 or 3 days, apply a heating pad set on low or a warm cloth to the area that hurts. Some doctors suggest that you go back and forth between hot and cold. · Do not wrap or tape your ribs for support. This may cause you to take smaller breaths, which could increase your risk of lung problems. · Mentholated creams such as Bengay or Icy Hot may soothe sore muscles. Follow the instructions on the package. · Follow your doctor's instructions for exercising. · Gentle stretching and massage may help you get better faster. Stretch slowly to the point just before pain begins, and hold the stretch for at least 15 to 30 seconds. Do this 3 or 4 times a day. Stretch just after you have applied heat. · As your pain gets better, slowly return to your normal activities. Any increased pain may be a sign that you need to rest a while longer. When should you call for help? Call 911 anytime you think you may need emergency care. For example, call if:  ? · You have chest pain or pressure. This may occur with:  ¨ Sweating. ¨ Shortness of breath. ¨ Nausea or vomiting. ¨ Pain that spreads from the chest to the neck, jaw, or one or both shoulders or arms. ¨ Dizziness or lightheadedness. ¨ A fast or uneven pulse. After calling 911, chew 1 adult-strength aspirin. Wait for an ambulance. Do not try to drive yourself. ? · You have sudden chest pain and shortness of breath, or you cough up blood. ?Call your doctor now or seek immediate medical care if:  ? · You have any trouble breathing. ? · Your chest pain gets worse. ? · Your chest pain occurs consistently with exercise and is relieved by rest.   ? Watch closely for changes in your health, and be sure to contact your doctor if:  ? · Your chest pain does not get better after 1 week. Where can you learn more? Go to http://lay-arminda.info/. Enter V293 in the search box to learn more about \"Musculoskeletal Chest Pain: Care Instructions. \"  Current as of: March 20, 2017  Content Version: 11.4  © 1599-5179 [employer]. Care instructions adapted under license by [employer] (which disclaims liability or warranty for this information). If you have questions about a medical condition or this instruction, always ask your healthcare professional. [place] [address] any warranty or liability for your use of this information. High Blood Pressure: Care Instructions  Your Care Instructions    If your blood pressure is usually above 140/90, you have high blood pressure, or hypertension. That means the top number is 140 or higher or the bottom number is 90 or higher, or both. Despite what a lot of people think, high blood pressure usually doesn't cause headaches or make you feel dizzy or lightheaded. It usually has no symptoms. But it does increase your risk for heart attack, stroke, and kidney or eye damage. The higher your blood pressure, the more your risk increases. Your doctor will give you a goal for your blood pressure. Your goal will be based on your health and your age. An example of a goal is to keep your blood pressure below 140/90. Lifestyle changes, such as eating healthy and being active, are always important to help lower blood pressure. You might also take medicine to reach your blood pressure goal.  Follow-up care is a key part of your treatment and safety. Be sure to make and go to all appointments, and call your doctor if you are having problems. It's also a good idea to know your test results and keep a list of the medicines you take. How can you care for yourself at home?   Medical treatment  · If you stop taking your medicine, your blood pressure will go back up. You may take one or more types of medicine to lower your blood pressure. Be safe with medicines. Take your medicine exactly as prescribed. Call your doctor if you think you are having a problem with your medicine. · Talk to your doctor before you start taking aspirin every day. Aspirin can help certain people lower their risk of a heart attack or stroke. But taking aspirin isn't right for everyone, because it can cause serious bleeding. · See your doctor regularly. You may need to see the doctor more often at first or until your blood pressure comes down. · If you are taking blood pressure medicine, talk to your doctor before you take decongestants or anti-inflammatory medicine, such as ibuprofen. Some of these medicines can raise blood pressure. · Learn how to check your blood pressure at home. Lifestyle changes  · Stay at a healthy weight. This is especially important if you put on weight around the waist. Losing even 10 pounds can help you lower your blood pressure. · If your doctor recommends it, get more exercise. Walking is a good choice. Bit by bit, increase the amount you walk every day. Try for at least 30 minutes on most days of the week. You also may want to swim, bike, or do other activities. · Avoid or limit alcohol. Talk to your doctor about whether you can drink any alcohol. · Try to limit how much sodium you eat to less than 2,300 milligrams (mg) a day. Your doctor may ask you to try to eat less than 1,500 mg a day. · Eat plenty of fruits (such as bananas and oranges), vegetables, legumes, whole grains, and low-fat dairy products. · Lower the amount of saturated fat in your diet. Saturated fat is found in animal products such as milk, cheese, and meat. Limiting these foods may help you lose weight and also lower your risk for heart disease. · Do not smoke. Smoking increases your risk for heart attack and stroke.  If you need help quitting, talk to your doctor about stop-smoking programs and medicines. These can increase your chances of quitting for good. When should you call for help? Call 911 anytime you think you may need emergency care. This may mean having symptoms that suggest that your blood pressure is causing a serious heart or blood vessel problem. Your blood pressure may be over 180/110. ? For example, call 911 if:  ? · You have symptoms of a heart attack. These may include:  ¨ Chest pain or pressure, or a strange feeling in the chest.  ¨ Sweating. ¨ Shortness of breath. ¨ Nausea or vomiting. ¨ Pain, pressure, or a strange feeling in the back, neck, jaw, or upper belly or in one or both shoulders or arms. ¨ Lightheadedness or sudden weakness. ¨ A fast or irregular heartbeat. ? · You have symptoms of a stroke. These may include:  ¨ Sudden numbness, tingling, weakness, or loss of movement in your face, arm, or leg, especially on only one side of your body. ¨ Sudden vision changes. ¨ Sudden trouble speaking. ¨ Sudden confusion or trouble understanding simple statements. ¨ Sudden problems with walking or balance. ¨ A sudden, severe headache that is different from past headaches. ? · You have severe back or belly pain. ?Do not wait until your blood pressure comes down on its own. Get help right away. ?Call your doctor now or seek immediate care if:  ? · Your blood pressure is much higher than normal (such as 180/110 or higher), but you don't have symptoms. ? · You think high blood pressure is causing symptoms, such as:  ¨ Severe headache. ¨ Blurry vision. ? Watch closely for changes in your health, and be sure to contact your doctor if:  ? · Your blood pressure measures 140/90 or higher at least 2 times. That means the top number is 140 or higher or the bottom number is 90 or higher, or both. ? · You think you may be having side effects from your blood pressure medicine.    ? · Your blood pressure is usually normal, but it goes above normal at least 2 times. Where can you learn more? Go to http://lay-arminda.info/. Enter E259 in the search box to learn more about \"High Blood Pressure: Care Instructions. \"  Current as of: September 21, 2016  Content Version: 11.4  © 0104-6502 [employer]. Care instructions adapted under license by [employer] (which disclaims liability or warranty for this information). If you have questions about a medical condition or this instruction, always ask your healthcare professional. [place] [address] any warranty or liability for your use of this information. DISCHARGE SUMMARY from Nurse    PATIENT INSTRUCTIONS:    After general anesthesia or intravenous sedation, for 24 hours or while taking prescription Narcotics:  · Limit your activities  · Do not drive and operate hazardous machinery  · Do not make important personal or business decisions  · Do  not drink alcoholic beverages  · If you have not urinated within 8 hours after discharge, please contact your surgeon on call. Report the following to your surgeon:  · Excessive pain, swelling, redness or odor of or around the surgical area  · Temperature over 100.5  · Nausea and vomiting lasting longer than 4 hours or if unable to take medications  · Any signs of decreased circulation or nerve impairment to extremity: change in color, persistent  numbness, tingling, coldness or increase pain  · Any questions    What to do at Home:  Recommended activity: Activity as tolerated,     If you experience any of the following symptoms Chest pain, palpitation, difficulty breathing, please follow up with PCP or Emergency Room. *  Please give a list of your current medications to your Primary Care Provider. *  Please update this list whenever your medications are discontinued, doses are      changed, or new medications (including over-the-counter products) are added.     *  Please carry medication information at all times in case of emergency situations. These are general instructions for a healthy lifestyle:    No smoking/ No tobacco products/ Avoid exposure to second hand smoke  Surgeon General's Warning:  Quitting smoking now greatly reduces serious risk to your health. Obesity, smoking, and sedentary lifestyle greatly increases your risk for illness    A healthy diet, regular physical exercise & weight monitoring are important for maintaining a healthy lifestyle    You may be retaining fluid if you have a history of heart failure or if you experience any of the following symptoms:  Weight gain of 3 pounds or more overnight or 5 pounds in a week, increased swelling in our hands or feet or shortness of breath while lying flat in bed. Please call your doctor as soon as you notice any of these symptoms; do not wait until your next office visit. Recognize signs and symptoms of STROKE:    F-face looks uneven    A-arms unable to move or move unevenly    S-speech slurred or non-existent    T-time-call 911 as soon as signs and symptoms begin-DO NOT go       Back to bed or wait to see if you get better-TIME IS BRAIN. Warning Signs of HEART ATTACK     Call 911 if you have these symptoms:   Chest discomfort. Most heart attacks involve discomfort in the center of the chest that lasts more than a few minutes, or that goes away and comes back. It can feel like uncomfortable pressure, squeezing, fullness, or pain.  Discomfort in other areas of the upper body. Symptoms can include pain or discomfort in one or both arms, the back, neck, jaw, or stomach.  Shortness of breath with or without chest discomfort.  Other signs may include breaking out in a cold sweat, nausea, or lightheadedness. Don't wait more than five minutes to call 911 - MINUTES MATTER! Fast action can save your life. Calling 911 is almost always the fastest way to get lifesaving treatment.  Emergency Medical Services staff can begin treatment when they arrive -- up to an hour sooner than if someone gets to the hospital by car. The discharge information has been reviewed with the patient. The patient verbalized understanding. Discharge medications reviewed with the patient and appropriate educational materials and side effects teaching were provided.   ___________________________________________________________________________________________________________________________________  Patient armband removed and shredded

## 2018-02-15 NOTE — CDMP QUERY
Please clarify if this patient is being treated/managed for:    => Demand Ischemia in the setting of Hypertensive crisis as evidenced by elevated troponin and significant HTN  =>Other Explanation of clinical findings  =>Unable to Determine (no explanation of clinical findings)    The medical record reflects the following:    Risk: HTN, heart murmur    Clinical Indicators: Patient presented to ER w/ chest pain. BP was noted to be significantly elevated 189/113. Trop .34, .33, .33. EKG showed Nonspecific T wave abnormality. H&P and Cardiology consult indicate BP is out of control. Patient was taken for cardiac cath which showed widely patent coronary arteries and normal LV function. Treatment:  IV labetolol, BP monitoed Q15 minutes, Cardiac cath, restart home BP meds and adjust as needed. Please clarify and document your clinical opinion in the progress notes and discharge summary including the definitive and/or presumptive diagnosis, (suspected or probable), related to the above clinical findings. Please include clinical findings supporting your diagnosis. If you DECLINE this query or would like to communicate with [facility], please utilize the \"[employer] message box\" at the TOP of the Progress Note on the right.       Thank you,  [facility], [address]

## 2018-02-15 NOTE — PROGRESS NOTES
Mr Eleanor Hemphill has been hospitalized at [facility] on 02/14/18 and should be able to return back to work on 02/19/18. Please call [phone] with questions.

## 2018-02-15 NOTE — PROGRESS NOTES
[facility] - Progress Note  Admit Date: 2/14/2018     The patient was seen, examined, and independently evaluated and I agree with the below assessment and plan by Marleni Ritchie PA-C with the following comments. This patient's coronaries were widely patent and although he has a fairly harsh SESAR along the LSB his echocardiogram shows no significant valvular abnormalities so there is no need for further CV workup and will sign off for now. Please call with any questions or new CV problems. Assessment:     Hospital Problems  Date Reviewed: 2/14/2018          Codes Class Noted POA    Elevated troponin ICD-10-CM: R74.8  ICD-9-CM: 790.6  2/14/2018 Yes        HTN (hypertension) ICD-10-CM: I10  ICD-9-CM: 401.9  2/14/2018 Yes        Chest pain ICD-10-CM: R07.9  ICD-9-CM: 786.50  2/14/2018 Unknown              -Noncardiac chest pain. Troponin indeterminate in setting of hypertension out of control. ECG with with likely LVH and strain but repeat with worsening inferolateral ST segment abnormalities. Echo with EF 55-60% but possible hypokinesis of basal inferior wall with mild concentric hypertrophy. Cath 2/14/2018 revealed Normal epicardial large coronary arteries. -Hypertension out of control. Patient is unclear of which antihypertensives he takes.  -Cough, severe yesterday, today with some improvement.  -Glaucoma.  -Childhood murmur. Plan:     Chest pain resolved. Cath without CAD. No further coronary work-up. BP improved. Continue on lopressor and lisinopril. Expressed importance of compliance. Noted fever overnight, possible viral illness? Recent cough. CXR without acute abnormalities. Discussed with Dr. Tian Perdomo. Electrolyte replacement as needed. No further cardiac work-up. Subjective:     Chest pain resolved. Mild cough but improved from day before admission.     Objective:      Patient Vitals for the past 8 hrs:   Temp Pulse Resp BP SpO2   02/15/18 0832 99.6 °F (37.6 °C) 80 20 145/76 94 % 02/15/18 0352 (!) 101.2 °F (38.4 °C) 75 20 134/79 96 %         Patient Vitals for the past 96 hrs:   Weight   02/15/18 0832 122.9 kg (270 lb 14.4 oz)   02/14/18 0503 121.1 kg (267 lb)                    Intake/Output Summary (Last 24 hours) at 02/15/18 1035  Last data filed at 02/15/18 0551   Gross per 24 hour   Intake              380 ml   Output                0 ml   Net              380 ml       Physical Exam:  General:  alert, cooperative, no distress, appears stated age  Neck:  no JVD  Lungs:  clear to auscultation bilaterally  Heart:  regular rate and rhythm  Abdomen:  abdomen is soft without significant tenderness, masses, organomegaly or guarding  Extremities:  extremities normal, atraumatic, no cyanosis or edema. Right radial site without hematoma with full peripheral pulses.     Data Review:     Labs: Results:       Chemistry Recent Labs      02/14/18 2001 02/14/18   0510   GLU  103*  118*   NA  137  141   K  3.4*  3.7   CL  102  103   CO2  27  30   BUN  16  21*   CREA  1.41*  1.31*   CA  8.6  9.1   AGAP  8  8   BUCR  11*  16   AP  23*   --    TP  7.4   --    ALB  3.5   --    GLOB  3.9   --    AGRAT  0.9   --       CBC w/Diff Recent Labs      02/14/18 2001 02/14/18   0933  02/14/18   0510   WBC  6.3  7.7  5.5   RBC  5.05  5.41  5.65*   HGB  13.1  13.6  14.1   HCT  38.8  42.0  44.1   PLT  148  160  168   GRANS   --   79*  65   LYMPH   --   8*  16*   EOS   --   4  6*      Cardiac Enzymes Lab Results   Component Value Date/Time    TROIQ 0.33 (H) 02/14/2018 08:01 PM      Coagulation Recent Labs      02/15/18   0540  02/14/18 2001   PTP   --   14.8   INR   --   1.2   APTT  37.3*  34.1       Lipid Panel Lab Results   Component Value Date/Time    Cholesterol, total 147 02/15/2018 05:40 AM    HDL Cholesterol 39 (L) 02/15/2018 05:40 AM    LDL, calculated 91.4 02/15/2018 05:40 AM    VLDL, calculated 16.6 02/15/2018 05:40 AM    Triglyceride 83 02/15/2018 05:40 AM    CHOL/HDL Ratio 3.8 02/15/2018 05:40 AM BNP No results found for: BNP, BNPP, XBNPT   Liver Enzymes Recent Labs      02/14/18 2001   TP  7.4   ALB  3.5   AP  23*   SGOT  22      Digoxin    Thyroid Studies Lab Results   Component Value Date/Time    TSH 0.49 02/14/2018 08:01 PM          Signed By: Dean Stuart DO     February 15, 2018

## 2018-02-15 NOTE — PROGRESS NOTES
Care Management Interventions  PCP Verified by CM: Yes (Dr. Margarita Gleason)  Mode of Transport at Discharge: Other (see comment)  Transition of Care Consult (CM Consult): Discharge Planning  Current Support Network: Lives with Spouse, Family Lives [place], [facility]  Confirm Follow Up Transport: Self  Plan discussed with Pt/Family/Caregiver: Yes  Discharge Location  Discharge Placement: Home with family assistance    Patient is a 39 yo male admitted for cardiac cath. Patient does not present with any d/c needs at this time. His sister will transport him home.

## 2018-02-15 NOTE — ROUTINE PROCESS
Bedside and Verbal shift change report given to Amol Peacock (oncoming nurse) by Amanda Cespedes   (offgoing nurse). Report included the following information SBAR, Intake/Output, MAR, Recent Results and Cardiac Rhythm Sinus Rhythm.

## 2018-02-21 LAB
BACTERIA SPEC CULT: NORMAL
BACTERIA SPEC CULT: NORMAL
SERVICE CMNT-IMP: NORMAL
SERVICE CMNT-IMP: NORMAL

## 2018-12-05 ENCOUNTER — HOSPITAL ENCOUNTER (OUTPATIENT)
Dept: LAB | Age: 43
Discharge: HOME | End: 2018-12-05
Payer: COMMERCIAL

## 2018-12-05 LAB
ALBUMIN SERPL-MCNC: 3.8 G/DL (ref 3.4–5)
ALBUMIN/GLOB SERPL: 1.1 {RATIO} (ref 0.8–1.7)
ALP SERPL-CCNC: 44 U/L (ref 45–117)
ALT SERPL-CCNC: 30 U/L (ref 16–61)
ANION GAP SERPL CALC-SCNC: 6 MMOL/L (ref 3–18)
AST SERPL-CCNC: 15 U/L (ref 15–37)
BASOPHILS # BLD: 0.1 K/UL (ref 0–0.1)
BASOPHILS NFR BLD: 1 % (ref 0–2)
BILIRUB SERPL-MCNC: 0.4 MG/DL (ref 0.2–1)
BUN SERPL-MCNC: 16 MG/DL (ref 7–18)
BUN/CREAT SERPL: 14 (ref 12–20)
CALCIUM SERPL-MCNC: 8.7 MG/DL (ref 8.5–10.1)
CHLORIDE SERPL-SCNC: 106 MMOL/L (ref 100–108)
CHOLEST SERPL-MCNC: 158 MG/DL
CO2 SERPL-SCNC: 28 MMOL/L (ref 21–32)
CREAT SERPL-MCNC: 1.11 MG/DL (ref 0.6–1.3)
DIFFERENTIAL METHOD BLD: ABNORMAL
EOSINOPHIL # BLD: 0.4 K/UL (ref 0–0.4)
EOSINOPHIL NFR BLD: 6 % (ref 0–5)
ERYTHROCYTE [DISTWIDTH] IN BLOOD BY AUTOMATED COUNT: 13.7 % (ref 11.6–14.5)
GLOBULIN SER CALC-MCNC: 3.5 G/DL (ref 2–4)
GLUCOSE SERPL-MCNC: 100 MG/DL (ref 74–99)
HCT VFR BLD AUTO: 43.2 % (ref 36–48)
HDLC SERPL-MCNC: 39 MG/DL (ref 40–60)
HDLC SERPL: 4.1 {RATIO} (ref 0–5)
HGB BLD-MCNC: 14.2 G/DL (ref 13–16)
LDLC SERPL CALC-MCNC: 103 MG/DL (ref 0–100)
LIPID PROFILE,FLP: ABNORMAL
LYMPHOCYTES # BLD: 2.2 K/UL (ref 0.9–3.6)
LYMPHOCYTES NFR BLD: 34 % (ref 21–52)
MCH RBC QN AUTO: 25.7 PG (ref 24–34)
MCHC RBC AUTO-ENTMCNC: 32.9 G/DL (ref 31–37)
MCV RBC AUTO: 78.3 FL (ref 74–97)
MONOCYTES # BLD: 0.6 K/UL (ref 0.05–1.2)
MONOCYTES NFR BLD: 10 % (ref 3–10)
NEUTS SEG # BLD: 3.2 K/UL (ref 1.8–8)
NEUTS SEG NFR BLD: 49 % (ref 40–73)
PLATELET # BLD AUTO: 184 K/UL (ref 135–420)
PMV BLD AUTO: 11.4 FL (ref 9.2–11.8)
POTASSIUM SERPL-SCNC: 4.1 MMOL/L (ref 3.5–5.5)
PROT SERPL-MCNC: 7.3 G/DL (ref 6.4–8.2)
RBC # BLD AUTO: 5.52 M/UL (ref 4.7–5.5)
SODIUM SERPL-SCNC: 140 MMOL/L (ref 136–145)
T4 SERPL-MCNC: 6.5 UG/DL (ref 4.7–13.3)
TRIGL SERPL-MCNC: 80 MG/DL (ref ?–150)
TSH SERPL DL<=0.05 MIU/L-ACNC: 0.96 UIU/ML (ref 0.36–3.74)
VLDLC SERPL CALC-MCNC: 16 MG/DL
WBC # BLD AUTO: 6.5 K/UL (ref 4.6–13.2)

## 2018-12-05 PROCEDURE — 85025 COMPLETE CBC W/AUTO DIFF WBC: CPT

## 2018-12-05 PROCEDURE — 80053 COMPREHEN METABOLIC PANEL: CPT

## 2018-12-05 PROCEDURE — 36415 COLL VENOUS BLD VENIPUNCTURE: CPT

## 2018-12-05 PROCEDURE — 84436 ASSAY OF TOTAL THYROXINE: CPT

## 2018-12-05 PROCEDURE — 80061 LIPID PANEL: CPT

## 2019-07-16 ENCOUNTER — HOSPITAL ENCOUNTER (OUTPATIENT)
Dept: LAB | Age: 44
Discharge: HOME | End: 2019-07-16
Payer: COMMERCIAL

## 2019-07-16 LAB
25(OH)D3 SERPL-MCNC: 22.4 NG/ML (ref 30–100)
ALBUMIN SERPL-MCNC: 4 G/DL (ref 3.4–5)
ALBUMIN/GLOB SERPL: 1.1 {RATIO} (ref 0.8–1.7)
ALP SERPL-CCNC: 32 U/L (ref 45–117)
ALT SERPL-CCNC: 32 U/L (ref 16–61)
ANION GAP SERPL CALC-SCNC: 6 MMOL/L (ref 3–18)
AST SERPL-CCNC: 19 U/L (ref 15–37)
BASOPHILS # BLD: 0.1 K/UL (ref 0–0.1)
BASOPHILS NFR BLD: 1 % (ref 0–2)
BILIRUB SERPL-MCNC: 1.3 MG/DL (ref 0.2–1)
BUN SERPL-MCNC: 22 MG/DL (ref 7–18)
BUN/CREAT SERPL: 13 (ref 12–20)
CALCIUM SERPL-MCNC: 9 MG/DL (ref 8.5–10.1)
CHLORIDE SERPL-SCNC: 101 MMOL/L (ref 100–108)
CHOLEST SERPL-MCNC: 184 MG/DL
CO2 SERPL-SCNC: 30 MMOL/L (ref 21–32)
CREAT SERPL-MCNC: 1.71 MG/DL (ref 0.6–1.3)
DIFFERENTIAL METHOD BLD: ABNORMAL
EOSINOPHIL # BLD: 0.2 K/UL (ref 0–0.4)
EOSINOPHIL NFR BLD: 3 % (ref 0–5)
ERYTHROCYTE [DISTWIDTH] IN BLOOD BY AUTOMATED COUNT: 14 % (ref 11.6–14.5)
GLOBULIN SER CALC-MCNC: 3.8 G/DL (ref 2–4)
GLUCOSE SERPL-MCNC: 113 MG/DL (ref 74–99)
HCT VFR BLD AUTO: 44.8 % (ref 36–48)
HDLC SERPL-MCNC: 39 MG/DL (ref 40–60)
HDLC SERPL: 4.7 {RATIO} (ref 0–5)
HGB BLD-MCNC: 15.1 G/DL (ref 13–16)
LDLC SERPL CALC-MCNC: 116.2 MG/DL (ref 0–100)
LIPID PROFILE,FLP: ABNORMAL
LYMPHOCYTES # BLD: 2.3 K/UL (ref 0.9–3.6)
LYMPHOCYTES NFR BLD: 40 % (ref 21–52)
MCH RBC QN AUTO: 26.1 PG (ref 24–34)
MCHC RBC AUTO-ENTMCNC: 33.7 G/DL (ref 31–37)
MCV RBC AUTO: 77.4 FL (ref 74–97)
MONOCYTES # BLD: 0.6 K/UL (ref 0.05–1.2)
MONOCYTES NFR BLD: 10 % (ref 3–10)
NEUTS SEG # BLD: 2.8 K/UL (ref 1.8–8)
NEUTS SEG NFR BLD: 46 % (ref 40–73)
PLATELET # BLD AUTO: 203 K/UL (ref 135–420)
PMV BLD AUTO: 12.2 FL (ref 9.2–11.8)
POTASSIUM SERPL-SCNC: 3.5 MMOL/L (ref 3.5–5.5)
PROT SERPL-MCNC: 7.8 G/DL (ref 6.4–8.2)
PSA SERPL-MCNC: 1.3 NG/ML (ref 0–4)
RBC # BLD AUTO: 5.79 M/UL (ref 4.7–5.5)
SODIUM SERPL-SCNC: 137 MMOL/L (ref 136–145)
T4 FREE SERPL-MCNC: 1.3 NG/DL (ref 0.7–1.5)
TRIGL SERPL-MCNC: 144 MG/DL (ref ?–150)
TSH SERPL DL<=0.05 MIU/L-ACNC: 0.01 UIU/ML (ref 0.36–3.74)
VLDLC SERPL CALC-MCNC: 28.8 MG/DL
WBC # BLD AUTO: 5.9 K/UL (ref 4.6–13.2)

## 2019-07-16 PROCEDURE — 84439 ASSAY OF FREE THYROXINE: CPT

## 2019-07-16 PROCEDURE — 36415 COLL VENOUS BLD VENIPUNCTURE: CPT

## 2019-07-16 PROCEDURE — 84153 ASSAY OF PSA TOTAL: CPT

## 2019-07-16 PROCEDURE — 80061 LIPID PANEL: CPT

## 2019-07-16 PROCEDURE — 84443 ASSAY THYROID STIM HORMONE: CPT

## 2019-07-16 PROCEDURE — 82306 VITAMIN D 25 HYDROXY: CPT

## 2019-07-16 PROCEDURE — 85025 COMPLETE CBC W/AUTO DIFF WBC: CPT

## 2019-07-16 PROCEDURE — 80053 COMPREHEN METABOLIC PANEL: CPT

## 2019-07-17 ENCOUNTER — HOSPITAL ENCOUNTER (OUTPATIENT)
Dept: NON INVASIVE DIAGNOSTICS | Age: 44
Discharge: HOME | End: 2019-07-17
Attending: NURSE PRACTITIONER
Payer: COMMERCIAL

## 2019-07-17 VITALS
DIASTOLIC BLOOD PRESSURE: 105 MMHG | SYSTOLIC BLOOD PRESSURE: 158 MMHG | BODY MASS INDEX: 34.65 KG/M2 | WEIGHT: 270 LBS | HEIGHT: 74 IN

## 2019-07-17 DIAGNOSIS — R01.1 HEART MURMUR: ICD-10-CM

## 2019-07-17 LAB
ECHO AO ASC DIAM: 3.38 CM
ECHO AO ROOT DIAM: 3.82 CM
ECHO LA AREA 4C: 20.3 CM2
ECHO LA VOL 2C: 66.03 ML (ref 18–58)
ECHO LA VOL 4C: 60.34 ML (ref 18–58)
ECHO LA VOL BP: 67.97 ML (ref 18–58)
ECHO LA VOL/BSA BIPLANE: 27.53 ML/M2 (ref 16–28)
ECHO LA VOLUME INDEX A2C: 26.75 ML/M2 (ref 16–28)
ECHO LA VOLUME INDEX A4C: 24.44 ML/M2 (ref 16–28)
ECHO LV E' LATERAL VELOCITY: 7.29 CM/S
ECHO LV E' SEPTAL VELOCITY: 5.22 CM/S
ECHO LV INTERNAL DIMENSION DIASTOLIC: 5.89 CM (ref 4.2–5.9)
ECHO LV INTERNAL DIMENSION SYSTOLIC: 4.34 CM
ECHO LV IVSD: 1.05 CM (ref 0.6–1)
ECHO LV MASS 2D: 321.7 G (ref 88–224)
ECHO LV MASS INDEX 2D: 130.3 G/M2 (ref 49–115)
ECHO LV POSTERIOR WALL DIASTOLIC: 1.13 CM (ref 0.6–1)
ECHO LVOT DIAM: 2.78 CM
ECHO LVOT PEAK GRADIENT: 3.3 MMHG
ECHO LVOT SV: 116.4 ML
ECHO LVOT VTI: 19.2 CM
ECHO MV A VELOCITY: 73.06 CM/S
ECHO MV E DECELERATION TIME (DT): 221.3 MS
ECHO MV E VELOCITY: 64.57 CM/S
ECHO MV E/A RATIO: 0.88
ECHO MV E/E' LATERAL: 8.86
ECHO MV E/E' RATIO (AVERAGED): 10.61
ECHO MV E/E' SEPTAL: 12.37
ECHO RV TAPSE: 2.28 CM (ref 1.5–2)
ECHO RVOT DIAMETER: 3.36 CM
ECHO RVOT VTI: 15.3 CM

## 2019-07-17 PROCEDURE — 93306 TTE W/DOPPLER COMPLETE: CPT

## 2019-07-18 ENCOUNTER — OFFICE VISIT (OUTPATIENT)
Dept: CARDIOLOGY CLINIC | Age: 44
End: 2019-07-18

## 2019-07-18 VITALS
SYSTOLIC BLOOD PRESSURE: 140 MMHG | DIASTOLIC BLOOD PRESSURE: 100 MMHG | BODY MASS INDEX: 34.14 KG/M2 | WEIGHT: 266 LBS | OXYGEN SATURATION: 98 % | HEART RATE: 67 BPM | HEIGHT: 74 IN

## 2019-07-18 DIAGNOSIS — I10 ESSENTIAL HYPERTENSION: ICD-10-CM

## 2019-07-18 DIAGNOSIS — I42.9 CARDIOMYOPATHY, UNSPECIFIED TYPE (HCC): ICD-10-CM

## 2019-07-18 DIAGNOSIS — Q21.0 VSD (VENTRICULAR SEPTAL DEFECT): Primary | ICD-10-CM

## 2019-07-18 DIAGNOSIS — R01.1 HEART MURMUR: ICD-10-CM

## 2019-07-18 RX ORDER — LOSARTAN POTASSIUM 100 MG/1
100 TABLET ORAL DAILY
Status: ON HOLD | COMMUNITY
End: 2019-08-16

## 2019-07-18 NOTE — PROGRESS NOTES
Vinny Santiago presents today for No chief complaint on file. Vinny Santiago preferred language for health care discussion is english/other. Is someone accompanying this pt? yes    Is the patient using any DME equipment during OV? no    Depression Screening:  No flowsheet data found. Learning Assessment:  Learning Assessment 7/18/2019   PRIMARY LEARNER Patient   BARRIERS PRIMARY LEARNER NONE   CO-LEARNER CAREGIVER No   PRIMARY LANGUAGE ENGLISH   LEARNER PREFERENCE PRIMARY READING   ANSWERED BY patient   RELATIONSHIP SELF       Abuse Screening:  Abuse Screening Questionnaire 7/18/2019   Do you ever feel afraid of your partner? N   Are you in a relationship with someone who physically or mentally threatens you? N   Is it safe for you to go home? Y       Fall Risk  Fall Risk Assessment, last 12 mths 7/18/2019   Able to walk? Yes   Fall in past 12 months? No       Pt currently taking Anticoagulant therapy? no    Coordination of Care:  1. Have you been to the ER, urgent care clinic since your last visit? Hospitalized since your last visit? no    2. Have you seen or consulted any other health care providers outside of the [address] [place] [address] since your last visit? Include any pap smears or colon screening.  no

## 2019-07-18 NOTE — PROGRESS NOTES
HISTORY OF PRESENT ILLNESS  Marni Madrid is a 37 y.o. male. HPI    Patient presents for an add-on office visit. Patient was initially seen in the hospital by 1 of my partners in February 2018 when he presented with chest pain concerning for an acute coronary syndrome. This was in February 2018. As result he underwent a cardiac catheterization which did not show any significant coronary artery disease. Ejection fraction 60%, LVEDP 15 mmHg. However at that time, he also underwent an echocardiogram which was concerning for a perimembranous VSD. Unfortunately, he was lost to follow-up until undergoing a repeat echocardiogram done just yesterday in our office which again showed a perimembranous VSD with a QP/QS calculation of 1.16. His left ventricle was mildly dilated with low normal LV systolic function, EF 51 to 55%. His right ventricle also appeared mildly dilated with mildly reduced function, with no estimation of PA pressures. Patient states that he has had a murmur since childhood and was evaluated by an outlying  pediatric clinic run by [facility] where he states he had regular follow-up until the age of 10years old. He states he was cleared to play football as a teenager and never had any issues with dizziness, syncope or shortness of breath. He has not experienced any new shortness of breath, leg swelling, orthopnea or PND. No heart palpitations or major change in his activity level. Past Medical History:   Diagnosis Date    Glaucoma     Heart murmur     Hypertension      Current Outpatient Medications   Medication Sig Dispense Refill    losartan (COZAAR) 100 mg tablet Take 100 mg by mouth daily.  HYDROCHLOROTHIAZIDE PO Take  by mouth.  amlodipine besylate (AMLODIPINE PO) Take  by mouth.  METHIMAZOLE PO Take  by mouth.  lisinopril (PRINIVIL, ZESTRIL) 10 mg tablet Take 1 Tab by mouth daily.  30 Tab 1    metoprolol tartrate (LOPRESSOR) 50 mg tablet Take 1 Tab by mouth two (2) times a day. 60 Tab 1    fluticasone (FLONASE) 50 mcg/actuation nasal spray 2 Sprays by Both Nostrils route daily. 1 Bottle 0    acetaminophen-codeine (TYLENOL-CODEINE #3) 300-30 mg per tablet 1-2 tabs Qhs prn pain 60 Tab 0    albuterol (PROVENTIL HFA, VENTOLIN HFA, PROAIR HFA) 90 mcg/actuation inhaler Take 2 Puffs by inhalation every four (4) hours as needed for Wheezing or Shortness of Breath (or cough). 1 Inhaler 2     No Known Allergies     Social History     Tobacco Use    Smoking status: Never Smoker    Smokeless tobacco: Never Used   Substance Use Topics    Alcohol use: No    Drug use: No     Family History   Problem Relation Age of Onset    Asthma Mother     Cancer Neg Hx     Diabetes Neg Hx     Heart Disease Neg Hx     Hypertension Neg Hx     Stroke Neg Hx          Review of Systems   Constitutional: Negative for chills, fever and weight loss. HENT: Negative for nosebleeds. Eyes: Negative for blurred vision and double vision. Respiratory: Negative for cough, shortness of breath and wheezing. Cardiovascular: Negative for chest pain, palpitations, orthopnea, claudication, leg swelling and PND. Gastrointestinal: Negative for abdominal pain, heartburn, nausea and vomiting. Genitourinary: Negative for dysuria and hematuria. Musculoskeletal: Negative for falls and myalgias. Skin: Negative for rash. Neurological: Negative for dizziness, focal weakness and headaches. Endo/Heme/Allergies: Does not bruise/bleed easily. Psychiatric/Behavioral: Negative for substance abuse. Visit Vitals  BP (!) 140/100   Pulse 67   Ht 6' 2\" (1.88 m)   Wt 120.7 kg (266 lb)   SpO2 98%   BMI 34.15 kg/m²       Physical Exam   Constitutional: He is oriented to person, place, and time. He appears well-developed and well-nourished. HENT:   Head: Normocephalic and atraumatic. Eyes: Conjunctivae are normal.   Neck: Neck supple. No JVD present. Carotid bruit is not present.    Cardiovascular: Normal rate, regular rhythm, S1 normal, S2 normal and normal pulses. Exam reveals no gallop and no S3.   Murmur heard. Harsh holosystolic murmur is present at the lower left sternal border. Pulmonary/Chest: Breath sounds normal. He has no wheezes. He has no rales. Abdominal: Soft. Bowel sounds are normal. There is no tenderness. Musculoskeletal: He exhibits no edema, tenderness or deformity. Neurological: He is alert and oriented to person, place, and time. Skin: Skin is warm and dry. Psychiatric: He has a normal mood and affect. His behavior is normal. Thought content normal.     EKG: Normal sinus rhythm, normal axis, borderline voltage criteria for LVH, poor R wave progression, nonspecific ST elevation likely due to early repolarization. Compared to the previous EKG from February 2018, inferolateral T wave inversions have resolved. ASSESSMENT and PLAN  Encounter Diagnoses   Name Primary?  VSD (ventricular septal defect) Yes    Heart murmur     Essential hypertension     Cardiomyopathy, unspecified type ([address]      Congenital VSD. My suspicion is that this is likely restrictive and will not require closure. However, a follow-up echocardiogram done just yesterday did show an enlarged right ventricle and mildly reduced systolic function, so he may have a more significant left to right shunt that was calculated by echo Doppler. I have recommended both a transesophageal echocardiogram for better visualization and also a right and left heart catheterization to invasively to determine his shunt fraction. Essential hypertension. Patient's blood pressure is mildly elevated today in the office. He has been managed with multiple hypertensive agents. He is not quite sure which ones he is taking at present, so he was instructed to call us back when he reviews his pill bottles at home. Cardiomyopathy.   Echocardiogram done yesterday showed a mildly dilated left ventricle with low normal LV systolic function, EF 51 to 55%. He underwent a left heart catheterization only in February 2018 which did not show any obstructive coronary disease. His coronaries will be reevaluated with a repeat angiography when scheduled later this summer. Follow-up to be determined following his CLINT and cardiac catheterization, which will be scheduled for next month.

## 2019-07-18 NOTE — LETTER
2019 3:36 PM 
 
 
 
Carlyn Deras Aliyah 
[id number] 
1975 Insurance:  [employer] PPO                      Auth # _____________________ Proc Date:                 Proc Time:  8:00 Performing MD : Dr. Matthew Benjamin                      Procedure:CLINT/L&R Cath Hospital:  [facility]                                            PCP Dr. Cris Smalls Scheduled with:  Palma-email                                                        Date:2019 HP:       EK/18    Labs:______  CXR: _______  Orders:   Special Instructions:  _____________________________________________________ 
______________________________________________________________________ 
______________________________________________________________________ Date Faxed:   ______________   Pages Faxed: ___________________ The materials enclosed with this facsimile transmission are private and confidential and are the property of the sender. If you are not the intended recipient, be advised that any unauthorized use, disclosure, copying, distribution, or the taking of any action in reliance on the contents of this telecopied information is strictly prohibited. If you have received this in error, please immediately notify the sender via telephone to arrange for return of the forwarded documentation.

## 2019-07-18 NOTE — PATIENT INSTRUCTIONS
Instructions    Patients Name:  Eam Valle    1. You are scheduled to have a CLINT/Right and left heart catheterization on August 16, 2019  at 0800 am Please check in at 0700 am  .     2. Please go to [facility] and park in the outpatient parking lot that is located around to the back of the hospital and enter through the [employer]. Once you enter through the [facility] check in with the  there. The  will either give you directions or assist you in getting to the cath holding area. 3. You are not to eat or drink anything after midnight the night before your procedure. Small sips of water to take your medications is ok. 4. If you are diabetic, do not take your insulin/sugar pill the morning of the procedure. 5. MEDICATION INSTRUCTIONS:   Please take your morning medications with the following special instructions:    [x]          Please make sure to take your Blood pressure medication :      6. We encourage families to wait in the waiting room on the first floor while the procedure is being done. The Doctor will come out and talk with you as soon as the procedure is over. 7. There is the possibility that you may spend the night in the hospital, depending on the results of the procedure. This will be determined after the procedure is done. If angioplasty or stent is planned, you will stay at least one day. 8. If you or your family have any questions, please call our office Monday Friday, 9:00 a. m.4:30 p.m.,  At [phone], and ask to speak to one of the nurses.

## 2019-07-26 ENCOUNTER — DOCUMENTATION ONLY (OUTPATIENT)
Dept: CARDIOLOGY CLINIC | Age: 44
End: 2019-07-26

## 2019-07-26 NOTE — PROGRESS NOTES
Office note and EKG from 7/18/19 faxed through [facility] to referring PCP [facility], NP.

## 2019-08-08 ENCOUNTER — HOSPITAL ENCOUNTER (OUTPATIENT)
Dept: ULTRASOUND IMAGING | Age: 44
Discharge: HOME | End: 2019-08-08
Attending: NURSE PRACTITIONER
Payer: COMMERCIAL

## 2019-08-08 ENCOUNTER — HOSPITAL ENCOUNTER (OUTPATIENT)
Dept: GENERAL RADIOLOGY | Age: 44
Discharge: HOME | End: 2019-08-08
Payer: COMMERCIAL

## 2019-08-08 ENCOUNTER — HOSPITAL ENCOUNTER (OUTPATIENT)
Dept: GENERAL RADIOLOGY | Age: 44
Discharge: HOME | End: 2019-08-08
Attending: NURSE PRACTITIONER
Payer: COMMERCIAL

## 2019-08-08 ENCOUNTER — HOSPITAL ENCOUNTER (OUTPATIENT)
Dept: PREADMISSION TESTING | Age: 44
Discharge: HOME | End: 2019-08-08
Payer: COMMERCIAL

## 2019-08-08 DIAGNOSIS — Q21.0 VSD (VENTRICULAR SEPTAL DEFECT): ICD-10-CM

## 2019-08-08 DIAGNOSIS — R01.1 HEART MURMUR: ICD-10-CM

## 2019-08-08 DIAGNOSIS — E05.90 HYPERTHYROIDISM: ICD-10-CM

## 2019-08-08 LAB
ALBUMIN SERPL-MCNC: 3.6 G/DL (ref 3.4–5)
ALBUMIN/GLOB SERPL: 1 {RATIO} (ref 0.8–1.7)
ALP SERPL-CCNC: 49 U/L (ref 45–117)
ALT SERPL-CCNC: 27 U/L (ref 16–61)
ANION GAP SERPL CALC-SCNC: 5 MMOL/L (ref 3–18)
AST SERPL-CCNC: 17 U/L (ref 10–38)
BASOPHILS # BLD: 0.1 K/UL (ref 0–0.1)
BASOPHILS NFR BLD: 1 % (ref 0–2)
BILIRUB SERPL-MCNC: 0.3 MG/DL (ref 0.2–1)
BUN SERPL-MCNC: 21 MG/DL (ref 7–18)
BUN/CREAT SERPL: 17 (ref 12–20)
CALCIUM SERPL-MCNC: 8.7 MG/DL (ref 8.5–10.1)
CHLORIDE SERPL-SCNC: 105 MMOL/L (ref 100–111)
CO2 SERPL-SCNC: 29 MMOL/L (ref 21–32)
CREAT SERPL-MCNC: 1.26 MG/DL (ref 0.6–1.3)
DIFFERENTIAL METHOD BLD: ABNORMAL
EOSINOPHIL # BLD: 0.5 K/UL (ref 0–0.4)
EOSINOPHIL NFR BLD: 8 % (ref 0–5)
ERYTHROCYTE [DISTWIDTH] IN BLOOD BY AUTOMATED COUNT: 13.4 % (ref 11.6–14.5)
GLOBULIN SER CALC-MCNC: 3.7 G/DL (ref 2–4)
GLUCOSE SERPL-MCNC: 109 MG/DL (ref 74–99)
HCT VFR BLD AUTO: 40.9 % (ref 36–48)
HGB BLD-MCNC: 13.6 G/DL (ref 13–16)
INR PPP: 1 (ref 0.8–1.2)
LYMPHOCYTES # BLD: 2.3 K/UL (ref 0.9–3.6)
LYMPHOCYTES NFR BLD: 39 % (ref 21–52)
MCH RBC QN AUTO: 25.4 PG (ref 24–34)
MCHC RBC AUTO-ENTMCNC: 33.3 G/DL (ref 31–37)
MCV RBC AUTO: 76.4 FL (ref 74–97)
MONOCYTES # BLD: 0.5 K/UL (ref 0.05–1.2)
MONOCYTES NFR BLD: 8 % (ref 3–10)
NEUTS SEG # BLD: 2.6 K/UL (ref 1.8–8)
NEUTS SEG NFR BLD: 44 % (ref 40–73)
PLATELET # BLD AUTO: 208 K/UL (ref 135–420)
PMV BLD AUTO: 11.9 FL (ref 9.2–11.8)
POTASSIUM SERPL-SCNC: 3.6 MMOL/L (ref 3.5–5.5)
PROT SERPL-MCNC: 7.3 G/DL (ref 6.4–8.2)
PROTHROMBIN TIME: 13.3 SEC (ref 11.5–15.2)
RBC # BLD AUTO: 5.35 M/UL (ref 4.7–5.5)
SODIUM SERPL-SCNC: 139 MMOL/L (ref 136–145)
WBC # BLD AUTO: 5.8 K/UL (ref 4.6–13.2)

## 2019-08-08 PROCEDURE — 85025 COMPLETE CBC W/AUTO DIFF WBC: CPT

## 2019-08-08 PROCEDURE — 85610 PROTHROMBIN TIME: CPT

## 2019-08-08 PROCEDURE — 36415 COLL VENOUS BLD VENIPUNCTURE: CPT

## 2019-08-08 PROCEDURE — 76536 US EXAM OF HEAD AND NECK: CPT

## 2019-08-08 PROCEDURE — 71046 X-RAY EXAM CHEST 2 VIEWS: CPT

## 2019-08-08 PROCEDURE — 80053 COMPREHEN METABOLIC PANEL: CPT

## 2019-08-16 ENCOUNTER — HOSPITAL ENCOUNTER (OUTPATIENT)
Dept: NON INVASIVE DIAGNOSTICS | Age: 44
Discharge: HOME | End: 2019-08-16
Payer: COMMERCIAL

## 2019-08-16 ENCOUNTER — HOSPITAL ENCOUNTER (OUTPATIENT)
Age: 44
Setting detail: OUTPATIENT SURGERY
Discharge: HOME | End: 2019-08-16
Attending: INTERNAL MEDICINE | Admitting: INTERNAL MEDICINE
Payer: COMMERCIAL

## 2019-08-16 ENCOUNTER — ANESTHESIA (OUTPATIENT)
Dept: NON INVASIVE DIAGNOSTICS | Age: 44
End: 2019-08-16
Payer: COMMERCIAL

## 2019-08-16 ENCOUNTER — ANESTHESIA EVENT (OUTPATIENT)
Dept: NON INVASIVE DIAGNOSTICS | Age: 44
End: 2019-08-16
Payer: COMMERCIAL

## 2019-08-16 VITALS
SYSTOLIC BLOOD PRESSURE: 137 MMHG | RESPIRATION RATE: 11 BRPM | WEIGHT: 266 LBS | DIASTOLIC BLOOD PRESSURE: 87 MMHG | OXYGEN SATURATION: 94 % | HEART RATE: 92 BPM | BODY MASS INDEX: 34.14 KG/M2 | HEIGHT: 74 IN

## 2019-08-16 VITALS
DIASTOLIC BLOOD PRESSURE: 87 MMHG | HEART RATE: 64 BPM | WEIGHT: 265 LBS | HEIGHT: 74 IN | SYSTOLIC BLOOD PRESSURE: 131 MMHG | RESPIRATION RATE: 13 BRPM | OXYGEN SATURATION: 98 % | BODY MASS INDEX: 34.01 KG/M2

## 2019-08-16 DIAGNOSIS — Q21.0 VENTRICULAR SEPTAL DEFECT: ICD-10-CM

## 2019-08-16 LAB
CRD SYSTOLIC BP: 117
END DIASTOLIC PRESSURE: 11

## 2019-08-16 PROCEDURE — C1751 CATH, INF, PER/CENT/MIDLINE: HCPCS | Performed by: INTERNAL MEDICINE

## 2019-08-16 PROCEDURE — 93453 R&L HRT CATH W/VENTRICLGRPHY: CPT | Performed by: INTERNAL MEDICINE

## 2019-08-16 PROCEDURE — 77030013797 HC KT TRNSDUC PRSSR EDWD -A: Performed by: INTERNAL MEDICINE

## 2019-08-16 PROCEDURE — 99152 MOD SED SAME PHYS/QHP 5/>YRS: CPT | Performed by: INTERNAL MEDICINE

## 2019-08-16 PROCEDURE — 77030013744: Performed by: INTERNAL MEDICINE

## 2019-08-16 PROCEDURE — 74011250636 HC RX REV CODE- 250/636: Performed by: INTERNAL MEDICINE

## 2019-08-16 PROCEDURE — C1894 INTRO/SHEATH, NON-LASER: HCPCS | Performed by: INTERNAL MEDICINE

## 2019-08-16 PROCEDURE — 96374 THER/PROPH/DIAG INJ IV PUSH: CPT

## 2019-08-16 PROCEDURE — 74011000250 HC RX REV CODE- 250

## 2019-08-16 PROCEDURE — 77030004558 HC CATH ANGI DX SUPR TORQ CARD -A: Performed by: INTERNAL MEDICINE

## 2019-08-16 PROCEDURE — 99153 MOD SED SAME PHYS/QHP EA: CPT | Performed by: INTERNAL MEDICINE

## 2019-08-16 PROCEDURE — 74011636320 HC RX REV CODE- 636/320: Performed by: INTERNAL MEDICINE

## 2019-08-16 PROCEDURE — 74011250636 HC RX REV CODE- 250/636

## 2019-08-16 PROCEDURE — 76060000031 HC ANESTHESIA FIRST 0.5 HR

## 2019-08-16 RX ORDER — PROPOFOL 10 MG/ML
INJECTION, EMULSION INTRAVENOUS AS NEEDED
Status: DISCONTINUED | OUTPATIENT
Start: 2019-08-16 | End: 2019-08-16 | Stop reason: HOSPADM

## 2019-08-16 RX ORDER — GUAIFENESIN 100 MG/5ML
81 LIQUID (ML) ORAL DAILY
COMMUNITY

## 2019-08-16 RX ORDER — SODIUM CHLORIDE 9 MG/ML
100 INJECTION, SOLUTION INTRAVENOUS CONTINUOUS
Status: DISPENSED | OUTPATIENT
Start: 2019-08-16 | End: 2019-08-16

## 2019-08-16 RX ORDER — GLYCOPYRROLATE 0.2 MG/ML
INJECTION INTRAMUSCULAR; INTRAVENOUS AS NEEDED
Status: DISCONTINUED | OUTPATIENT
Start: 2019-08-16 | End: 2019-08-16 | Stop reason: HOSPADM

## 2019-08-16 RX ORDER — ACETAMINOPHEN 325 MG/1
650 TABLET ORAL
Status: DISCONTINUED | OUTPATIENT
Start: 2019-08-16 | End: 2019-08-16 | Stop reason: HOSPADM

## 2019-08-16 RX ORDER — LOSARTAN POTASSIUM 100 MG/1
100 TABLET ORAL DAILY
COMMUNITY

## 2019-08-16 RX ORDER — SODIUM CHLORIDE 9 MG/ML
100 INJECTION, SOLUTION INTRAVENOUS CONTINUOUS
Status: DISCONTINUED | OUTPATIENT
Start: 2019-08-16 | End: 2019-08-16 | Stop reason: HOSPADM

## 2019-08-16 RX ORDER — LIDOCAINE HYDROCHLORIDE 10 MG/ML
INJECTION, SOLUTION EPIDURAL; INFILTRATION; INTRACAUDAL; PERINEURAL AS NEEDED
Status: DISCONTINUED | OUTPATIENT
Start: 2019-08-16 | End: 2019-08-16 | Stop reason: HOSPADM

## 2019-08-16 RX ORDER — NITROGLYCERIN 400 UG/1
1 SPRAY ORAL
Status: DISCONTINUED | OUTPATIENT
Start: 2019-08-16 | End: 2019-08-16 | Stop reason: HOSPADM

## 2019-08-16 RX ORDER — MIDAZOLAM HYDROCHLORIDE 1 MG/ML
INJECTION, SOLUTION INTRAMUSCULAR; INTRAVENOUS AS NEEDED
Status: DISCONTINUED | OUTPATIENT
Start: 2019-08-16 | End: 2019-08-16 | Stop reason: HOSPADM

## 2019-08-16 RX ORDER — FENTANYL CITRATE 50 UG/ML
INJECTION, SOLUTION INTRAMUSCULAR; INTRAVENOUS AS NEEDED
Status: DISCONTINUED | OUTPATIENT
Start: 2019-08-16 | End: 2019-08-16 | Stop reason: HOSPADM

## 2019-08-16 RX ORDER — OXYCODONE AND ACETAMINOPHEN 5; 325 MG/1; MG/1
1-2 TABLET ORAL
Status: DISCONTINUED | OUTPATIENT
Start: 2019-08-16 | End: 2019-08-16 | Stop reason: HOSPADM

## 2019-08-16 RX ADMIN — PROPOFOL 200 MG: 10 INJECTION, EMULSION INTRAVENOUS at 08:52

## 2019-08-16 RX ADMIN — PROPOFOL 50 MG: 10 INJECTION, EMULSION INTRAVENOUS at 08:58

## 2019-08-16 RX ADMIN — SODIUM CHLORIDE: 900 INJECTION, SOLUTION INTRAVENOUS at 08:46

## 2019-08-16 RX ADMIN — GLYCOPYRROLATE 0.2 MG: 0.2 INJECTION INTRAMUSCULAR; INTRAVENOUS at 08:47

## 2019-08-16 RX ADMIN — PROPOFOL 50 MG: 10 INJECTION, EMULSION INTRAVENOUS at 09:02

## 2019-08-16 RX ADMIN — SODIUM CHLORIDE 100 ML/HR: 900 INJECTION, SOLUTION INTRAVENOUS at 11:13

## 2019-08-16 RX ADMIN — PROPOFOL 50 MG: 10 INJECTION, EMULSION INTRAVENOUS at 09:06

## 2019-08-16 NOTE — Clinical Note
Sheath #2: Sheath: inserted. Sheath inserted/placed in the right femoral  artery. Hemostasis achieved.

## 2019-08-16 NOTE — Clinical Note
TRANSFER - IN REPORT:  
 
Verbal report received from: CVT RN SAUD. Report consisted of patient's Situation, Background, Assessment and  
Recommendations(SBAR). Opportunity for questions and clarification was provided. Assessment completed upon patient's arrival to unit and care assumed. Patient transported with a Cardiac Cath Tech / Patient Care Tech.

## 2019-08-16 NOTE — Clinical Note
Contrast Dose Calculator:  
Patient's age: 37.  
Patient's sex: Male. Patient weight (kg) = 120.2. Creatinine level (mg/dL) = 1.26. Creatinine clearance (mL/min): 126. Contrast concentration (mg/mL) = 300. MACD = 300 mL. Max Contrast dose per Creatinine Cl calculator = 283.5 mL.

## 2019-08-16 NOTE — PROGRESS NOTES
A+Ox4, denied any complaints. Ambulatory without difficulty. Right groin dressing intact, no bleeding or swelling. Discharge information reviewed. Escorted to car, tot his wife for transport.

## 2019-08-16 NOTE — DISCHARGE INSTRUCTIONS
Patient Education      Patient Education       DISCHARGE SUMMARY from Nurse        PATIENT INSTRUCTIONS:    After general anesthesia or intravenous sedation, for 24 hours or while taking prescription Narcotics:  · Limit your activities  · Do not drive and operate hazardous machinery  · Do not make important personal or business decisions  · Do  not drink alcoholic beverages  · If you have not urinated within 8 hours after discharge, please contact your surgeon on call. Report the following to your surgeon:  · Excessive pain, swelling, redness or odor of or around the surgical area  · Temperature over 100.5  · Nausea and vomiting lasting longer than 4 hours or if unable to take medications  · Any signs of decreased circulation or nerve impairment to extremity: change in color, persistent  numbness, tingling, coldness or increase pain  · Any questions    What to do at Home:  Recommended activity: No lifting, Driving, or Strenuous exercise for 24 hours. If you experience any of the following symptoms bleeding,swelling,acute pain or numbness, fever, please follow up with Dr. Job Reed. Juan Pablo Benson MD.    *  Please give a list of your current medications to your Primary Care Provider. *  Please update this list whenever your medications are discontinued, doses are      changed, or new medications (including over-the-counter products) are added. *  Please carry medication information at all times in case of emergency situations. These are general instructions for a healthy lifestyle:    No smoking/ No tobacco products/ Avoid exposure to second hand smoke  Surgeon General's Warning:  Quitting smoking now greatly reduces serious risk to your health.     Obesity, smoking, and sedentary lifestyle greatly increases your risk for illness    A healthy diet, regular physical exercise & weight monitoring are important for maintaining a healthy lifestyle    You may be retaining fluid if you have a history of heart failure or if you experience any of the following symptoms:  Weight gain of 3 pounds or more overnight or 5 pounds in a week, increased swelling in our hands or feet or shortness of breath while lying flat in bed. Please call your doctor as soon as you notice any of these symptoms; do not wait until your next office visit. The discharge information has been reviewed with the patient. The patient verbalized understanding. Discharge medications reviewed with the patient and appropriate educational materials and side effects teaching were provided. ___________________________________________________________________________________________________________________________________                       Cardiac Catheterization/Angiography Discharge Instructions    *Check the puncture site frequently for swelling or bleeding. If you see any bleeding, lie down and apply pressure over the area with a clean town or washcloth. Notify your doctor for any redness, swelling, drainage or oozing from the puncture site. Notify your doctor for any fever or chills. *If the leg or arm with the puncture becomes cold, numb or painful, call Dr Claudeen Quinones. Lester Lawton MD at  [phone]    *Activity should be limited for the next 48 hours. Climb stairs as little as possible and avoid any stooping, bending or strenuous activity for 48 hours. No heavy lifting (anything over 10 pounds) for three days. *Do not drive for 48 hours. *You may resume your usual diet. Drink more fluids than usual.    *Have a responsible person drive you home and stay with you for at least 24 hours after your heart catheterization/angiography. *You may remove the bandage from your Right Groin in 24 hours. You may shower in 24 hours. No tub baths, hot tubs or swimming for one week. Do not place any lotions, creams, powders, ointments over the puncture site for one week. You may place a clean band-aid over the puncture site each day for 5 days.  Change this daily. Left Heart Catheterization: About This Test  What is it? Cardiac catheterization is a test to check the left side of your heart. Your doctor might look at the shape of your heart, the motion of your heart, or the blood pressure inside the chambers. Why is this test done? This test gives information about how your heart is working. It can:  · Check blood flow and blood pressure in the chambers of the heart. · Check the pumping action of the heart. · Find out if a heart defect is present and how severe it is. · Find out how well the heart valves work. What happens during the test?  · You will get medicine to help you relax. · A thin tube called a catheter is put into a blood vessel in the groin or the arm. The doctor moves the catheter through the blood vessel into your heart. · You will get a shot to numb the skin where the catheter goes in. You may feel pressure when the doctor moves the catheter through your blood vessel into your heart. · Dye may be injected into your heart. Your doctor can watch on special monitors as the dye moves in your heart. The dye helps your doctor see blood flow in your heart. · You may feel hot or flushed for several seconds when the dye is put in.  · If a heart defect is found, cardiac catheterization sometimes is used to correct it during the test.  How long does it take? · The test will take about 30 minutes. If a problem is found and the doctor treats it, it can take a few hours longer. What happens after the test?  · You will stay in a room for at least a few hours to make sure the catheter site starts to heal. You may have a bandage or a compression device on your groin or arm to prevent bleeding. · If the catheter was placed in your groin, you may lie in bed for a few hours. If the catheter was put in your arm, you will need to keep your arm still for at least 1 hour. · You may or may not need to stay in the hospital overnight.  You will get more instructions for what to do at home. · Drink plenty of fluids for several hours after the test.  Follow-up care is a key part of your treatment and safety. Be sure to make and go to all appointments, and call your doctor if you are having problems. It's also a good idea to know your test results and keep a list of the medicines you take. Where can you learn more? Go to http://lay-arminda.info/. Enter W306 in the search box to learn more about \"Left Heart Catheterization: About This Test.\"  Current as of: 2018  Content Version: 12.1  © 8474-5265 [employer]. Care instructions adapted under license by [employer] (which disclaims liability or warranty for this information). If you have questions about a medical condition or this instruction, always ask your healthcare professional. [address] any warranty or liability for your use of this information. Patient armband removed and shredded    MyChart Activation    Thank you for requesting access to [facility]. Please follow the instructions below to securely access and download your online medical record. [facility] allows you to send messages to your doctor, view your test results, renew your prescriptions, schedule appointments, and more. How Do I Sign Up? 1. In your internet browser, go to [email or web address]. 2. Click on the First Time User? Click Here link in the Sign In box. You will see the New Member Sign Up page. 3. Enter your [facility] Access Code exactly as it appears below. You will not need to use this code after youve completed the sign-up process. If you do not sign up before the expiration date, you must request a new code. [facility] Access Code: [email or web address]  Expires: 2019  5:24 PM (This is the date your [facility] access code will )    4.  Enter the last four digits of your Social Security Number (xxxx) and Date of Birth (nelly/joe/yyyy) as indicated and click Submit. You will be taken to the next sign-up page. 5. Create a [email or web address] ID. This will be your [email or web address] login ID and cannot be changed, so think of one that is secure and easy to remember. 6. Create a [email or web address] password. You can change your password at any time. 7. Enter your Password Reset Question and Answer. This can be used at a later time if you forget your password. 8. Enter your e-mail address. You will receive e-mail notification when new information is available in [address] 9. Click Sign Up. You can now view and download portions of your medical record. 10. Click the Download Summary menu link to download a portable copy of your medical information. Additional Information    If you have questions, please visit the Frequently Asked Questions section of the [email or web address] website at [email or web address] Remember, [email or web address] is NOT to be used for urgent needs. For medical emergencies, dial 911.

## 2019-08-16 NOTE — PROGRESS NOTES
Right FAS/V aspirated and pulled @ 1045, by JT pressure held for 20 min. Sterile hemostatic dressing applied. No bleeding or swelling. Pain:0/10. Safety instructions reviewed.

## 2019-08-16 NOTE — Clinical Note
TRANSFER - OUT REPORT:  
 
[facility] Report consisted of patient's Situation, Background, Assessment and  
Recommendations(SBAR). Opportunity for questions and clarification was provided. Patient transported to: [address]

## 2019-08-16 NOTE — PROGRESS NOTES
CLINT was completed in the cath holding area. Report to follow.     [address] [facility]

## 2019-08-16 NOTE — ANESTHESIA PREPROCEDURE EVALUATION
Relevant Problems   No relevant active problems       Anesthetic History   No history of anesthetic complications            Review of Systems / Medical History  Patient summary reviewed and pertinent labs reviewed    Pulmonary        Sleep apnea  Undiagnosed apnea         Neuro/Psych   Within defined limits           Cardiovascular    Hypertension              Exercise tolerance: >4 METS     GI/Hepatic/Renal  Within defined limits              Endo/Other  Within defined limits           Other Findings              Physical Exam    Airway  Mallampati: III  TM Distance: 4 - 6 cm  Neck ROM: normal range of motion   Mouth opening: Normal     Cardiovascular  Regular rate and rhythm,  S1 and S2 normal,  no murmur, click, rub, or gallop  Rhythm: regular  Rate: normal         Dental    Dentition: Lower dentition intact and Upper dentition intact     Pulmonary  Breath sounds clear to auscultation               Abdominal  GI exam deferred       Other Findings            Anesthetic Plan    ASA: 3  Anesthesia type: MAC          Induction: Intravenous  Anesthetic plan and risks discussed with: Patient

## 2019-08-16 NOTE — H&P
HISTORY OF PRESENT ILLNESS  Dot Stef is a 37 y.o. male.     HPI     Patient presents for an add-on office visit. Patient was initially seen in the hospital by 1 of my partners in February 2018 when he presented with chest pain concerning for an acute coronary syndrome. This was in February 2018. As result he underwent a cardiac catheterization which did not show any significant coronary artery disease. Ejection fraction 60%, LVEDP 15 mmHg. However at that time, he also underwent an echocardiogram which was concerning for a perimembranous VSD. Unfortunately, he was lost to follow-up until undergoing a repeat echocardiogram done just yesterday in our office which again showed a perimembranous VSD with a QP/QS calculation of 1.16. His left ventricle was mildly dilated with low normal LV systolic function, EF 51 to 55%. His right ventricle also appeared mildly dilated with mildly reduced function, with no estimation of PA pressures.     Patient states that he has had a murmur since childhood and was evaluated by an outlying  pediatric clinic run by [facility] where he states he had regular follow-up until the age of 10years old. He states he was cleared to play football as a teenager and never had any issues with dizziness, syncope or shortness of breath. He has not experienced any new shortness of breath, leg swelling, orthopnea or PND. No heart palpitations or major change in his activity level.          Past Medical History:   Diagnosis Date    Glaucoma      Heart murmur      Hypertension               Current Outpatient Medications   Medication Sig Dispense Refill    losartan (COZAAR) 100 mg tablet Take 100 mg by mouth daily.        HYDROCHLOROTHIAZIDE PO Take  by mouth.        amlodipine besylate (AMLODIPINE PO) Take  by mouth.        METHIMAZOLE PO Take  by mouth.        lisinopril (PRINIVIL, ZESTRIL) 10 mg tablet Take 1 Tab by mouth daily.  30 Tab 1    metoprolol tartrate (LOPRESSOR) 50 mg tablet Take 1 Tab by mouth two (2) times a day. 60 Tab 1    fluticasone (FLONASE) 50 mcg/actuation nasal spray 2 Sprays by Both Nostrils route daily. 1 Bottle 0    acetaminophen-codeine (TYLENOL-CODEINE #3) 300-30 mg per tablet 1-2 tabs Qhs prn pain 60 Tab 0    albuterol (PROVENTIL HFA, VENTOLIN HFA, PROAIR HFA) 90 mcg/actuation inhaler Take 2 Puffs by inhalation every four (4) hours as needed for Wheezing or Shortness of Breath (or cough). 1 Inhaler 2      No Known Allergies      Social History           Tobacco Use    Smoking status: Never Smoker    Smokeless tobacco: Never Used   Substance Use Topics    Alcohol use: No    Drug use: No      Family History   Problem Relation Age of Onset    Asthma Mother      Cancer Neg Hx      Diabetes Neg Hx      Heart Disease Neg Hx      Hypertension Neg Hx      Stroke Neg Hx              Review of Systems   Constitutional: Negative for chills, fever and weight loss. HENT: Negative for nosebleeds. Eyes: Negative for blurred vision and double vision. Respiratory: Negative for cough, shortness of breath and wheezing. Cardiovascular: Negative for chest pain, palpitations, orthopnea, claudication, leg swelling and PND. Gastrointestinal: Negative for abdominal pain, heartburn, nausea and vomiting. Genitourinary: Negative for dysuria and hematuria. Musculoskeletal: Negative for falls and myalgias. Skin: Negative for rash. Neurological: Negative for dizziness, focal weakness and headaches. Endo/Heme/Allergies: Does not bruise/bleed easily. Psychiatric/Behavioral: Negative for substance abuse.      Visit Vitals  BP (!) 140/100   Pulse 67   Ht 6' 2\" (1.88 m)   Wt 120.7 kg (266 lb)   SpO2 98%   BMI 34.15 kg/m²         Physical Exam   Constitutional: He is oriented to person, place, and time. He appears well-developed and well-nourished. HENT:   Head: Normocephalic and atraumatic. Eyes: Conjunctivae are normal.   Neck: Neck supple. No JVD present. Carotid bruit is not present. Cardiovascular: Normal rate, regular rhythm, S1 normal, S2 normal and normal pulses. Exam reveals no gallop and no S3.   Murmur heard. Harsh holosystolic murmur is present at the lower left sternal border. Pulmonary/Chest: Breath sounds normal. He has no wheezes. He has no rales. Abdominal: Soft. Bowel sounds are normal. There is no tenderness. Musculoskeletal: He exhibits no edema, tenderness or deformity. Neurological: He is alert and oriented to person, place, and time. Skin: Skin is warm and dry. Psychiatric: He has a normal mood and affect. His behavior is normal. Thought content normal.      EKG: Normal sinus rhythm, normal axis, borderline voltage criteria for LVH, poor R wave progression, nonspecific ST elevation likely due to early repolarization. Compared to the previous EKG from February 2018, inferolateral T wave inversions have resolved.     ASSESSMENT and PLAN       Encounter Diagnoses   Name Primary?  VSD (ventricular septal defect) Yes    Heart murmur      Essential hypertension      Cardiomyopathy, unspecified type ([address]        Congenital VSD. My suspicion is that this is likely restrictive and will not require closure. However, a follow-up echocardiogram done just yesterday did show an enlarged right ventricle and mildly reduced systolic function, so he may have a more significant left to right shunt that was calculated by echo Doppler. I have recommended both a transesophageal echocardiogram for better visualization and also a right and left heart catheterization to invasively to determine his shunt fraction.     Essential hypertension. Patient's blood pressure is mildly elevated today in the office. He has been managed with multiple hypertensive agents. He is not quite sure which ones he is taking at present, so he was instructed to call us back when he reviews his pill bottles at home.     Cardiomyopathy.   Echocardiogram done yesterday showed a mildly dilated left ventricle with low normal LV systolic function, EF 51 to 55%. He underwent a left heart catheterization only in February 2018 which did not show any obstructive coronary disease. His coronaries will be reevaluated with a repeat angiography when scheduled later this summer    Will proceed with [facility] today as scheduled. CLINT hopefully as well if anesthesia available.

## 2019-08-17 NOTE — ANESTHESIA POSTPROCEDURE EVALUATION
* No procedures listed *. MAC    Anesthesia Post Evaluation      Multimodal analgesia: multimodal analgesia used between 6 hours prior to anesthesia start to PACU discharge  Patient location during evaluation: bedside  Patient participation: complete - patient participated  Level of consciousness: awake  Pain score: 0  Pain management: adequate  Airway patency: patent  Anesthetic complications: no  Cardiovascular status: stable  Respiratory status: acceptable  Hydration status: acceptable  Post anesthesia nausea and vomiting:  none      No vitals data found for the desired time range.

## 2020-01-10 ENCOUNTER — OFFICE VISIT (OUTPATIENT)
Dept: ORTHOPEDIC SURGERY | Facility: CLINIC | Age: 45
End: 2020-01-10

## 2020-01-10 VITALS
WEIGHT: 280.2 LBS | RESPIRATION RATE: 18 BRPM | TEMPERATURE: 97.3 F | BODY MASS INDEX: 35.96 KG/M2 | DIASTOLIC BLOOD PRESSURE: 99 MMHG | OXYGEN SATURATION: 100 % | HEIGHT: 74 IN | HEART RATE: 67 BPM | SYSTOLIC BLOOD PRESSURE: 150 MMHG

## 2020-01-10 DIAGNOSIS — Z98.890 S/P LEFT KNEE ARTHROSCOPY: ICD-10-CM

## 2020-01-10 DIAGNOSIS — M25.562 CHRONIC PAIN OF LEFT KNEE: ICD-10-CM

## 2020-01-10 DIAGNOSIS — M17.12 PRIMARY OSTEOARTHRITIS OF LEFT KNEE: Primary | ICD-10-CM

## 2020-01-10 DIAGNOSIS — M11.20 CHONDROCALCINOSIS: ICD-10-CM

## 2020-01-10 DIAGNOSIS — G89.29 CHRONIC PAIN OF LEFT KNEE: ICD-10-CM

## 2020-01-10 DIAGNOSIS — E66.01 SEVERE OBESITY (HCC): ICD-10-CM

## 2020-01-10 RX ORDER — BETAMETHASONE SODIUM PHOSPHATE AND BETAMETHASONE ACETATE 3; 3 MG/ML; MG/ML
6 INJECTION, SUSPENSION INTRA-ARTICULAR; INTRALESIONAL; INTRAMUSCULAR; SOFT TISSUE ONCE
Qty: 0.5 ML | Refills: 0
Start: 2020-01-10 | End: 2020-01-10

## 2020-01-10 NOTE — PROGRESS NOTES
Patient: Dionne Núñez                MRN: [id number]       SSN: [id number]  YOB: 1975        AGE: 53 y.o. SEX: male    PCP: Zaire Galvan NP  01/10/20    Chief Complaint   Patient presents with    Knee Pain     Left     HISTORY:  Dionne Núñez is a 53 y.o. male who is seen for medial left knee pain. He is s/p left knee arthroscopy medial menisectomy on 1/11/17--doing well. He has been experiencing increased pain for the past 2 weeks. He has been experiencing giving away episodes. He feels more pain when applying pressure to his left knee. He sometimes uses a knee brace. 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 He lives in [place] with his mother, 63-year-old daughter, 63-year-old daughter, and 8year-old son. He played softball up until about 10 years ago. He reports that his weight is stable. His 13year-old daughter plays petar varsity basketball for [employer]. He gained 15 pounds recently. He weighs 280 pounds and is 6'2\" tall.     Weight Metrics 1/10/2020 8/16/2019 8/16/2019 7/18/2019 7/17/2019 2/15/2018 11/22/2017   Weight 280 lb 3.2 oz 266 lb 265 lb 266 lb 270 lb 270 lb 14.4 oz 260 lb   BMI 35.98 kg/m2 34.15 kg/m2 34.02 kg/m2 34.15 kg/m2 34.67 kg/m2 34.78 kg/m2 33.38 kg/m2     Patient Active Problem List   Diagnosis Code    Elevated troponin R79.89    HTN (hypertension) I10    Chest pain R07.9    VSD (ventricular septal defect) Q21.0     REVIEW OF SYSTEMS: All Below are Negative except: See HPI   Constitutional: negative for fever, chills, and weight loss. Cardiovascular: negative for chest pain, claudication, leg swelling, SOB, MONTALVO   Gastrointestinal: Negative for pain, N/V/C/D, Blood in stool or urine, dysuria,  hematuria, incontinence, pelvic pain. Musculoskeletal: See HPI   Neurological: Negative for dizziness and weakness. Negative for headaches, Visual changes, confusion, seizures   Phychiatric/Behavioral: Negative for depression, memory loss, substance  abuse. Extremities: Negative for hair changes, rash, or skin lesion changes. Hematologic: Negative for bleeding problems, bruising, pallor or swollen lymph  nodes   Peripheral Vascular: No calf pain, no circulation deficits.     Social History     Socioeconomic History    Marital status:      Spouse name: Not on file    Number of children: Not on file    Years of education: Not on file    Highest education level: Not on file   Occupational History    Not on file   Social Needs    Financial resource strain: Not on file    Food insecurity:     Worry: Not on file     Inability: Not on file    Transportation needs:     Medical: Not on file     Non-medical: Not on file   Tobacco Use    Smoking status: Never Smoker    Smokeless tobacco: Never Used   Substance and Sexual Activity    Alcohol use: No    Drug use: No    Sexual activity: Not on file   Lifestyle    Physical activity:     Days per week: Not on file     Minutes per session: Not on file    Stress: Not on file   Relationships    Social connections:     Talks on phone: Not on file     Gets together: Not on file     Attends Buddhism service: Not on file     Active member of club or organization: Not on file     Attends meetings of clubs or organizations: Not on file     Relationship status: Not on file    Intimate partner violence:     Fear of current or ex partner: Not on file     Emotionally abused: Not on file     Physically abused: Not on file     Forced sexual activity: Not on file   Other Topics Concern    Not on file   Social History Narrative    Not on file      No Known Allergies   Current Outpatient Medications   Medication Sig    aspirin 81 mg chewable tablet Take 81 mg by mouth daily.  losartan (COZAAR) 100 mg tablet Take 100 mg by mouth daily.  HYDROCHLOROTHIAZIDE PO Take 25 mg by mouth daily.  amlodipine besylate (AMLODIPINE PO) Take 5 mg by mouth daily.  METHIMAZOLE PO Take  by mouth. No current facility-administered medications for this visit.        PHYSICAL EXAMINATION:  Visit Vitals  BP (!) 150/99   Pulse 67   Temp 97.3 °F (36.3 °C) (Oral)   Resp 18   Ht 6' 2\" (1.88 m)   Wt 280 lb 3.2 oz (127.1 kg)   SpO2 100%   BMI 35.98 kg/m²     ORTHO EXAMINATION:  Examination Right knee Left knee   Skin Intact Intact, well healed arthroscopy portals   Range of motion 115-0 115-0   Effusion - -   Medial joint line tenderness + +   Lateral joint line tenderness - -   Popliteal tenderness - -   Osteophytes palpable +, medial and lateral +, medial and lateral   Arys - -   Patella crepitus + +   Anterior drawer - -   Lateral laxity - -   Medial laxity - -   Varus deformity - -   Valgus deformity - -   Pretibial edema - -   Calf tenderness - -     TIME OUT:  Chart reviewed for the following:   Thao Mckeon MD, have reviewed the History, Physical and updated the Allergic reactions for [address] performed immediately prior to start of procedure:  Thao Mckeon MD, have performed the following reviews [facility] prior to the start of the procedure:          * Patient was identified by name and date of birth   * Agreement on procedure being performed was verified  * Risks and Benefits explained to the patient  * Procedure site verified and marked as necessary  * Patient was positioned for comfort  * Consent was obtained     Time: 9:04 AM     Date of procedure: 1/10/2020  Procedure performed by:  Jeannette Cotton Romayne Huxley, MD  Mr. Virginie Uribe tolerated the procedure well with no complications. MRI LEFT KNEE WO CONT 11/9/16  IMPRESSION:   1. Horizontal tear of the entire medial meniscus with adjacent parameniscal cyst.  2. Horizontal tear of the posterior horn of the lateral meniscus. 3. Grade 2 sprain of the MCL.  -Small focus of grade 3 chondromalacia of the medial femoral cartilage. RADIOGRAPHS:  XR LEFT KNEE 1/10/20 [facility]  IMPRESSION:  Three views - No fractures, no effusion, mild medial joint space narrowing, + osteophytes present. Kellgren Davy grade 1, chondrocalcinosis     XR LULÚ KNEES 9/15/16  IMPRESSION:  No fractures, no effusion, moderate joint space narrowing, small lateral osteophytes present, flattening and squaring of the medial femoral condyle. IMPRESSION:      ICD-10-CM ICD-9-CM    1. Primary osteoarthritis of left knee M17.12 715.16 betamethasone (CELESTONE SOLUSPAN) 6 mg/mL injection      BETAMETHASONE ACETATE & SODIUM PHOSPHATE INJECTION 3 MG EA.      DRAIN/INJECT LARGE JOINT/BURSA      PROCEDURE AUTHORIZATION TO    2. Chronic pain of left knee M25.562 719.46 AMB POC X-RAY KNEE 3 VIEW    G89.29 338.29 betamethasone (CELESTONE SOLUSPAN) 6 mg/mL injection      BETAMETHASONE ACETATE & SODIUM PHOSPHATE INJECTION 3 MG EA.      DRAIN/INJECT LARGE JOINT/BURSA      PROCEDURE AUTHORIZATION TO    3. Chondrocalcinosis M11.20 275.49      712.30    4. S/P left knee arthroscopy Z98.890 V45.89      PLAN:  After discussing treatment options, patient's left knee was injected with 4 cc Marcaine and 1/2 cc Celestone. Consider visco supplementation if pain continues. There is no need for further surgery at this time. Dietary counseling provided today. Start weight loss with low carb diet and intermittent fasting. He will follow up as needed.      Scribed by Elsa Manzano  ([address]) as dictated by Ata Olivas MD

## 2020-01-10 NOTE — PROGRESS NOTES
Verbal order given by Dr. Alisson Marks to draw up 4 mL 0.25% Sensorcaine and 0.5 mL 30 mg/5mL Betamethasone.

## 2020-01-10 NOTE — PATIENT INSTRUCTIONS
Learning About Low-Carbohydrate Diets for Weight Loss  What is a low-carbohydrate diet? Low-carb diets avoid foods that are high in carbohydrate. These high-carb foods include pasta, bread, rice, cereal, fruits, and starchy vegetables. Instead, these diets usually have you eat foods that are high in fat and protein. Many people lose weight quickly on a low-carb diet. But the early weight loss is water. People on this diet often gain the weight back after they start eating carbs again. Not all diet plans are safe or work well. A lot of the evidence shows that low-carb diets aren't healthy. That's because these diets often don't include healthy foods like fruits and vegetables. Losing weight safely means balancing protein, fat, and carbs with every meal and snack. And low-carb diets don't always provide the vitamins, minerals, and fiber you need. If you have a serious medical condition, talk to your doctor before you try any diet. These conditions include kidney disease, heart disease, type 2 diabetes, high cholesterol, and high blood pressure. If you are pregnant, it may not be safe for your baby if you are on a low-carb diet. How can you lose weight safely? You might have heard that a diet plan helped another person lose weight. But that doesn't mean that it will work for you. It is very hard to stay on a diet that includes lots of big changes in your eating habits. If you want to get to a healthy weight and stay there, making healthy lifestyle changes will often work better than dieting. These steps can help. · Make a plan for change. Work with your doctor to create a plan that is right for you. · See a dietitian. He or she can show you how to make healthy changes in your eating habits. · Manage stress. If you have a lot of stress in your life, it can be hard to focus on making healthy changes to your daily habits. · Track your food and activity.  You are likely to do better at losing weight if you keep track of what you eat and what you do. Follow-up care is a key part of your treatment and safety. Be sure to make and go to all appointments, and call your doctor if you are having problems. It's also a good idea to know your test results and keep a list of the medicines you take. Where can you learn more? Go to http://lay-arminda.info/. Enter A121 in the search box to learn more about \"Learning About Low-Carbohydrate Diets for Weight Loss. \"  Current as of: November 7, 2018  Content Version: 12.2  © 2966-0410 [employer]. Care instructions adapted under license by [employer] (which disclaims liability or warranty for this information). If you have questions about a medical condition or this instruction, always ask your healthcare professional. [address] any warranty or liability for your use of this information.

## 2020-01-29 ENCOUNTER — TELEPHONE (OUTPATIENT)
Dept: ORTHOPEDIC SURGERY | Facility: CLINIC | Age: 45
End: 2020-01-29

## 2020-01-29 NOTE — TELEPHONE ENCOUNTER
Patient called stating that he was supposed to have an order for gel injections sent to his insurance but has not heard anything regarding them. So he called his insurance to see what was taking so long and he was told that his insurance does not cover certain injections. He was wondering what his next step is going to be to help his knee pain.  Please advise patient at [phone]

## 2020-01-30 NOTE — TELEPHONE ENCOUNTER
Visco-not covered--per Dr Martínez Forward we can try to get samples--pt informed we will call him when samples received

## 2020-02-04 NOTE — TELEPHONE ENCOUNTER
Patient called asking if the injection medicine has come in for his knee. He did schedule an appointment to be seen on 2/7/2020.  Please advise patient at [phone]

## 2020-02-04 NOTE — TELEPHONE ENCOUNTER
Spoke with patient to inform samples have arrived in office and he is able to start at next office visit on 02/07/2020. Pt acknowledges understanding. Rx placed in medicine cabinet with patient named labeled.  Julisa Santillan LPN

## 2020-02-07 ENCOUNTER — OFFICE VISIT (OUTPATIENT)
Dept: ORTHOPEDIC SURGERY | Age: 45
End: 2020-02-07

## 2020-02-07 VITALS
TEMPERATURE: 98.6 F | OXYGEN SATURATION: 93 % | HEIGHT: 74 IN | SYSTOLIC BLOOD PRESSURE: 114 MMHG | RESPIRATION RATE: 16 BRPM | BODY MASS INDEX: 35.68 KG/M2 | HEART RATE: 73 BPM | WEIGHT: 278 LBS | DIASTOLIC BLOOD PRESSURE: 88 MMHG

## 2020-02-07 DIAGNOSIS — M25.562 CHRONIC PAIN OF LEFT KNEE: ICD-10-CM

## 2020-02-07 DIAGNOSIS — M17.12 PRIMARY OSTEOARTHRITIS OF LEFT KNEE: ICD-10-CM

## 2020-02-07 DIAGNOSIS — G89.29 CHRONIC PAIN OF LEFT KNEE: ICD-10-CM

## 2020-02-07 DIAGNOSIS — M76.822 POSTERIOR TIBIAL TENDINITIS OF LEFT LOWER EXTREMITY: Primary | ICD-10-CM

## 2020-02-07 DIAGNOSIS — M25.572 CHRONIC PAIN OF LEFT ANKLE: ICD-10-CM

## 2020-02-07 DIAGNOSIS — G89.29 CHRONIC PAIN OF LEFT ANKLE: ICD-10-CM

## 2020-02-07 NOTE — PROGRESS NOTES
1. Have you been to the ER, urgent care clinic since your last visit? Hospitalized since your last visit? No    2. Have you seen or consulted any other health care providers outside of the [address] [place] [address] since your last visit? Include any pap smears or colon screening.  No

## 2020-02-07 NOTE — PROGRESS NOTES
Patient: Edy Schmitt                MRN: [id number]       SSN: [id number]  YOB: 1975        AGE: 53 y.o. SEX: male    PCP: Garett Pedraza NP  02/07/20    CC: LEFT KNEE, LEFT CALF AND LEFT ANKLE PAIN    HISTORY:  Edy Schmitt is a 53 y.o. male who is seen for left knee, left calf and left ankle pain. His left ankle and left calf started hurting while compensating for his left knee pain. He feels primarily medial and posterior ankle and calf pain. He is also seen for left knee pain. He is s/p left knee arthroscopy medial menisectomy on 1/11/17--doing well. He has been experiencing increased pain for the past 2 weeks. He has been experiencing giving away episodes. He feels more pain when applying pressure to his left knee. He sometimes uses a knee brace. 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 He lives in [place] with his mother, 35-year-old daughter, 77-year-old daughter, and 8year-old son. He played softball up until about 10 years ago. He reports that his weight is stable. His 13year-old daughter plays petar varsity basketball for [employer]. He gained 15 pounds recently. He weighs 280 pounds and is 6'2\" tall.     Weight Metrics 2/7/2020 1/10/2020 8/16/2019 8/16/2019 7/18/2019 7/17/2019 2/15/2018   Weight 278 lb 280 lb 3.2 oz 266 lb 265 lb 266 lb 270 lb 270 lb 14.4 oz   BMI 35.69 kg/m2 35.98 kg/m2 34.15 kg/m2 34.02 kg/m2 34.15 kg/m2 34.67 kg/m2 34.78 kg/m2     Patient Active Problem List   Diagnosis Code    Elevated troponin R79.89    HTN (hypertension) I10    Chest pain R07.9    VSD (ventricular septal defect) Q21.0    Severe obesity ([facility] [address] E66.01     REVIEW OF SYSTEMS: All Below are Negative except: See HPI   Constitutional: negative for fever, chills, and weight loss. Cardiovascular: negative for chest pain, claudication, leg swelling, SOB, MONTALVO   Gastrointestinal: Negative for pain, N/V/C/D, Blood in stool or urine, dysuria,  hematuria, incontinence, pelvic pain. Musculoskeletal: See HPI   Neurological: Negative for dizziness and weakness. Negative for headaches, Visual changes, confusion, seizures   Phychiatric/Behavioral: Negative for depression, memory loss, substance  abuse. Extremities: Negative for hair changes, rash, or skin lesion changes. Hematologic: Negative for bleeding problems, bruising, pallor or swollen lymph  nodes   Peripheral Vascular: No calf pain, no circulation deficits.     Social History     Socioeconomic History    Marital status:      Spouse name: Not on file    Number of children: Not on file    Years of education: Not on file    Highest education level: Not on file   Occupational History    Not on file   Social Needs    Financial resource strain: Not on file    Food insecurity:     Worry: Not on file     Inability: Not on file    Transportation needs:     Medical: Not on file     Non-medical: Not on file   Tobacco Use    Smoking status: Never Smoker    Smokeless tobacco: Never Used   Substance and Sexual Activity    Alcohol use: No    Drug use: No    Sexual activity: Not on file   Lifestyle    Physical activity:     Days per week: Not on file     Minutes per session: Not on file    Stress: Not on file   Relationships    Social connections:     Talks on phone: Not on file     Gets together: Not on file     Attends Mosque service: Not on file     Active member of club or organization: Not on file     Attends meetings of clubs or organizations: Not on file     Relationship status: Not on file    Intimate partner violence:     Fear of current or ex partner: Not on file     Emotionally abused: Not on file     Physically abused: Not on file     Forced sexual activity: Not on file   Other Topics Concern    Not on file   Social History Narrative    Not on file      No Known Allergies   Current Outpatient Medications   Medication Sig    aspirin 81 mg chewable tablet Take 81 mg by mouth daily.  losartan (COZAAR) 100 mg tablet Take 100 mg by mouth daily.  HYDROCHLOROTHIAZIDE PO Take 25 mg by mouth daily.  amlodipine besylate (AMLODIPINE PO) Take 5 mg by mouth daily.  METHIMAZOLE PO Take  by mouth. No current facility-administered medications for this visit.        PHYSICAL EXAMINATION:  Visit Vitals  /88   Pulse 73   Temp 98.6 °F (37 °C) (Oral)   Resp 16   Ht 6' 2\" (1.88 m)   Wt 278 lb (126.1 kg)   SpO2 93%   BMI 35.69 kg/m²     ORTHO EXAMINATION:  Examination Right knee Left knee   Skin Intact Intact, well healed arthroscopy portals   Range of motion 115-0 115-0   Effusion - -   Medial joint line tenderness + +   Lateral joint line tenderness - -   Popliteal tenderness - -   Osteophytes palpable +, medial and lateral +, medial and lateral   Arys - -   Patella crepitus + +   Anterior drawer - -   Lateral laxity - -   Medial laxity - -   Varus deformity - -   Valgus deformity - -   Pretibial edema - -   Calf tenderness - -     Examination Right Ankle/Foot Left Ankle/Foot   Skin Intact Intact   Swelling - -   Dorsiflexion 10 5   Plantarflexion 25 25   Deformity - -   Inversion laxity - -   Anterior drawer - -   Medial tenderness - + medial malleolus, posterior tibia   Lateral tenderness - -   Heel cord Intact Intact   Sensation Intact Intact   Bunion - -   Toe nails Normal Normal   Capillary refill Normal Normal     TIME OUT:  Chart reviewed for the following:   Sarah Lim MD, have reviewed the History, Physical and updated the Allergic reactions for [address] performed immediately prior to start of procedure:  Sarah Lim MD, have performed the following reviews on Oralia Villanueva prior to the start of the procedure:          * Patient was identified by name and date of birth   * Agreement on procedure being performed was verified  * Risks and Benefits explained to the patient  * Procedure site verified and marked as necessary  * Patient was positioned for comfort  * Consent was obtained     Time: 3:40 PM    Date of procedure: 2/7/2020  Procedure performed by:  Gisell Hernandez MD  Mr. Porfirio Pulliam tolerated the procedure well with no complications. MRI LEFT KNEE WO CONT 11/9/16  IMPRESSION:   1. Horizontal tear of the entire medial meniscus with adjacent parameniscal cyst.  2. Horizontal tear of the posterior horn of the lateral meniscus. 3. Grade 2 sprain of the MCL.  -Small focus of grade 3 chondromalacia of the medial femoral cartilage. RADIOGRAPHS:  XR LEFT KNEE 1/10/20 [facility]  IMPRESSION:  Three views - No fractures, no effusion, mild medial joint space narrowing, + osteophytes present. Kellgren Davy grade 1, chondrocalcinosis     XR LULÚ KNEES 9/15/16  IMPRESSION:  No fractures, no effusion, moderate joint space narrowing, small lateral osteophytes present, flattening and squaring of the medial femoral condyle. IMPRESSION:      ICD-10-CM ICD-9-CM    1. Posterior tibial tendinitis of left lower extremity M76.822 726.72    2. Chronic pain of left ankle M25.572 719.47     G89.29 338.29    3. Primary osteoarthritis of left knee M17.12 715.16 DRAIN/INJECT LARGE JOINT/BURSA   4.  Chronic pain of left knee M25.562 719.46 DRAIN/INJECT LARGE JOINT/BURSA    G89.29 338.29      PLAN:  After discussing treatment options, patient's left knee was injected with 2 cc Euflexxa samples (Lot #: [id number], Exp Date: 2020-10-18) There is no need for further surgery at this time. Dietary counseling provided today. Start weight loss with low carb diet and intermittent fasting. He will follow up PRN with Dr. Zaria Tellez for ortho foot/ankle consultation. He will follow up in 1 week for Euflexxa #2.     Scribed by Cj Olsen  (Onofre Florence) as dictated by Jayden Gonzales MD

## 2020-02-14 ENCOUNTER — OFFICE VISIT (OUTPATIENT)
Dept: ORTHOPEDIC SURGERY | Facility: CLINIC | Age: 45
End: 2020-02-14

## 2020-02-14 VITALS
OXYGEN SATURATION: 95 % | HEIGHT: 74 IN | BODY MASS INDEX: 35.86 KG/M2 | TEMPERATURE: 97 F | DIASTOLIC BLOOD PRESSURE: 92 MMHG | HEART RATE: 72 BPM | WEIGHT: 279.4 LBS | SYSTOLIC BLOOD PRESSURE: 140 MMHG | RESPIRATION RATE: 16 BRPM

## 2020-02-14 DIAGNOSIS — M17.12 PRIMARY OSTEOARTHRITIS OF LEFT KNEE: Primary | ICD-10-CM

## 2020-02-14 DIAGNOSIS — M25.562 CHRONIC PAIN OF LEFT KNEE: ICD-10-CM

## 2020-02-14 DIAGNOSIS — G89.29 CHRONIC PAIN OF LEFT KNEE: ICD-10-CM

## 2020-02-14 NOTE — PROGRESS NOTES
Patient: Jersey Velazquez                MRN: [id number]       SSN: [id number]  YOB: 1975        AGE: 53 y.o. SEX: male  Body mass index is 35.87 kg/m². PCP: Erwin Mensah  02/14/20    Chief Complaint   Patient presents with    Knee Pain     Left knee pain     HISTORY:  Jersey Velazquez is a 53 y.o. male who is seen for left knee pain. ICD-10-CM ICD-9-CM    1. Primary osteoarthritis of left knee M17.12 715.16 DRAIN/INJECT LARGE JOINT/BURSA   2. Chronic pain of left knee M25.562 719.46 DRAIN/INJECT LARGE JOINT/BURSA    G89.29 338.29        Chart reviewed for the following:   Nico Morocho MD, have reviewed the History, Physical and updated the Allergic reactions for [address] performed immediately prior to start of procedure:  Nico Morocho MD, have performed the following reviews on Jersey Velazquez prior to the start of the procedure:            * Patient was identified by name and date of birth   * Agreement on procedure being performed was verified  * Risks and Benefits explained to the patient  * Procedure site verified and marked as necessary  * Patient was positioned for comfort  * Consent was obtained     Time: 3:51 PM     Date of procedure: 2/14/2020    Procedure performed by:  Prem Cowart MD    Mr. Evan Morin tolerated the procedure well with no complications. PLAN:  After discussing treatment options, patient's left knee was injected with 2 cc of Euflexxa sample (Lot #: [id number], Exp Date: 2020-12-15). Mr. Evan Morin will follow up in one week to finish his visco supplementation injection series.       Scribed by Emily Vo ([address]) as dictated by Prem Cowart MD

## 2020-02-14 NOTE — PROGRESS NOTES
1. Have you been to the ER, urgent care clinic since your last visit? Hospitalized since your last visit? No    2. Have you seen or consulted any other health care providers outside of the [address] [place] [address] since your last visit? Include any pap smears or colon screening.  No

## 2020-02-21 ENCOUNTER — OFFICE VISIT (OUTPATIENT)
Dept: ORTHOPEDIC SURGERY | Facility: CLINIC | Age: 45
End: 2020-02-21

## 2020-02-21 VITALS
HEIGHT: 74 IN | HEART RATE: 81 BPM | WEIGHT: 279 LBS | OXYGEN SATURATION: 95 % | DIASTOLIC BLOOD PRESSURE: 97 MMHG | TEMPERATURE: 97 F | RESPIRATION RATE: 16 BRPM | BODY MASS INDEX: 35.81 KG/M2 | SYSTOLIC BLOOD PRESSURE: 153 MMHG

## 2020-02-21 DIAGNOSIS — M17.12 PRIMARY OSTEOARTHRITIS OF LEFT KNEE: Primary | ICD-10-CM

## 2020-02-21 DIAGNOSIS — M25.562 CHRONIC PAIN OF LEFT KNEE: ICD-10-CM

## 2020-02-21 DIAGNOSIS — G89.29 CHRONIC PAIN OF LEFT KNEE: ICD-10-CM

## 2020-02-21 NOTE — PROGRESS NOTES
Patient: Escobar Jules                MRN: [id number]       SSN: [id number]  YOB: 1975        AGE: 53 y.o. SEX: male  Body mass index is 35.82 kg/m². PCP: Erwin Osorio  02/21/20    Chief Complaint   Patient presents with    Knee Pain     Left knee pain     HISTORY:  Escobar Jules is a 53 y.o. male who is seen for left knee pain. TIME OUT performed immediately prior to start of procedure:  Margarita Fonseca MD, have performed the following reviews on Escobar Jules prior to the start of the procedure:            * Patient was identified by name and date of birth   * Agreement on procedure being performed was verified  * Risks and Benefits explained to the patient  * Procedure site verified and marked as necessary  * Patient was positioned for comfort  * Consent was obtained     Time: 3:30 PM     Date of procedure: 2/21/2020    Procedure performed by:  Betty Trevizo MD    Mr. Dannie Mauro tolerated the procedure well with no complications      [id number] ICD-9-CM    1. Primary osteoarthritis of left knee M17.12 715.16 DRAIN/INJECT LARGE JOINT/BURSA   2. Chronic pain of left knee M25.562 719.46 DRAIN/INJECT LARGE JOINT/BURSA    G89.29 338.29      PLAN:  After discussing treatment options, patient's left knee was injected with 2 cc of Euflexxa sample (Lot #: [id number], Exp Date: 2020-12-15). Mr. Dannie Mauro will follow up PRN now that he has completed his visco supplementation injection series.       Scribed by Candelaria Swann ([address]) as dictated by Betty Trevizo MD

## 2020-02-21 NOTE — PROGRESS NOTES
1. Have you been to the ER, urgent care clinic since your last visit? Hospitalized since your last visit? No    2. Have you seen or consulted any other health care providers outside of the [address] [place] [address] since your last visit? Include any pap smears or colon screening.  No

## 2020-03-16 ENCOUNTER — OFFICE VISIT (OUTPATIENT)
Dept: ORTHOPEDIC SURGERY | Facility: CLINIC | Age: 45
End: 2020-03-16

## 2020-03-16 VITALS
BODY MASS INDEX: 36.65 KG/M2 | DIASTOLIC BLOOD PRESSURE: 89 MMHG | WEIGHT: 285.6 LBS | HEART RATE: 75 BPM | OXYGEN SATURATION: 98 % | SYSTOLIC BLOOD PRESSURE: 142 MMHG | TEMPERATURE: 97.3 F | RESPIRATION RATE: 18 BRPM | HEIGHT: 74 IN

## 2020-03-16 DIAGNOSIS — M25.562 CHRONIC PAIN OF LEFT KNEE: ICD-10-CM

## 2020-03-16 DIAGNOSIS — G89.29 CHRONIC PAIN OF LEFT KNEE: ICD-10-CM

## 2020-03-16 DIAGNOSIS — M17.12 PRIMARY OSTEOARTHRITIS OF LEFT KNEE: Primary | ICD-10-CM

## 2020-03-16 RX ORDER — DICLOFENAC SODIUM 75 MG/1
75 TABLET, DELAYED RELEASE ORAL 2 TIMES DAILY WITH MEALS
Qty: 60 TAB | Refills: 1 | Status: SHIPPED | OUTPATIENT
Start: 2020-03-16 | End: 2020-06-02 | Stop reason: SDUPTHER

## 2020-03-16 NOTE — PROGRESS NOTES
HISTORY OF PRESENT ILLNESS:  hTai Lowe returns with his wife status post completion of Euflexxa viscosupplementation to his left knee. He is still plagued with left knee pain. He thinks the Euflexxa did help somewhat but is looking for the next suggestion for his plan of care. PHYSICAL EXAM:  On exam today, his MCL, LCL, ACL, and PCL are all intact against resistance. He does have a 1+ effusion. His range of motion while supine is 105-5ø with crepitation throughout. The patient has a negative Lachman's sign. He has a negative Ary's sign. RADIOGRAPHS:  X-rays revealed today of the left knee tricompartmental osteoarthritis of the knee worse in the medial joint space, as well as patellofemoral with narrowing noted laterally as well. PLAN:   At this point, he has tried over-the-counter Motrin and Tylenol, which have minimally helped his symptoms. I am going to go ahead and write a prescription for Voltaren enteric-coated tablets 75 mg to take one po b.i.d. with food. He is going to follow back with us on a prn basis. Weight loss was discussed today, as well as activity management to include no running or jumping, limited crawling, squatting, and climbing. Today, all his questions were answered to his satisfaction.

## 2020-06-02 RX ORDER — DICLOFENAC SODIUM 75 MG/1
75 TABLET, DELAYED RELEASE ORAL 2 TIMES DAILY WITH MEALS
Qty: 60 TAB | Refills: 1 | Status: SHIPPED | OUTPATIENT
Start: 2020-06-02 | End: 2020-09-15 | Stop reason: SDUPTHER

## 2020-06-02 NOTE — TELEPHONE ENCOUNTER
Patient requesting refill of diclofenac EC (VOLTAREN) 75 mg EC tablet called in to [phone] on file.

## 2020-06-02 NOTE — TELEPHONE ENCOUNTER
Last Visit: 3/16/20 with LOLA Dickey  Next Appointment: none  Previous Refill Encounter(s): 3/16/20 #60 with 1 refill    Requested Prescriptions     Pending Prescriptions Disp Refills    diclofenac EC (VOLTAREN) 75 mg EC tablet 60 Tab 1     Sig: Take 1 Tab by mouth two (2) times daily (with meals).

## 2020-06-23 NOTE — PROGRESS NOTES
Attempted to call sister to find out what ABX pt is on for UTI. Sister did not answer.   Will attempt to call back     Flakito Narvaez RN  06/23/20 [phone] Chief Complaint   Patient presents with    Knee Pain     Left     0/10 pain.

## 2020-09-15 RX ORDER — DICLOFENAC SODIUM 75 MG/1
75 TABLET, DELAYED RELEASE ORAL 2 TIMES DAILY WITH MEALS
Qty: 60 TAB | Refills: 1 | Status: SHIPPED | OUTPATIENT
Start: 2020-09-15

## 2020-09-15 NOTE — TELEPHONE ENCOUNTER
Last Visit: 3/16/20 with LOLA Dickey  Next Appointment: Advised to follow-up PRN  Previous Refill Encounter(s): 6/2/20 #60 with 1 refill    Requested Prescriptions     Pending Prescriptions Disp Refills    diclofenac EC (VOLTAREN) 75 mg EC tablet 60 Tab 1     Sig: Take 1 Tab by mouth two (2) times daily (with meals).

## 2022-03-19 PROBLEM — R07.9 CHEST PAIN: Status: ACTIVE | Noted: 2018-02-14

## 2022-03-19 PROBLEM — I10 HTN (HYPERTENSION): Status: ACTIVE | Noted: 2018-02-14

## 2022-03-19 PROBLEM — E66.01 SEVERE OBESITY: Status: ACTIVE | Noted: 2020-01-10

## 2022-03-19 PROBLEM — R79.89 ELEVATED TROPONIN: Status: ACTIVE | Noted: 2018-02-14

## 2022-03-20 PROBLEM — Q21.0 VSD (VENTRICULAR SEPTAL DEFECT): Status: ACTIVE | Noted: 2019-07-18

## 2022-03-25 ENCOUNTER — OFFICE VISIT (OUTPATIENT)
Dept: ORTHOPEDIC SURGERY | Age: 47
End: 2022-03-25
Payer: COMMERCIAL

## 2022-03-25 VITALS
OXYGEN SATURATION: 97 % | TEMPERATURE: 97.3 F | WEIGHT: 285 LBS | BODY MASS INDEX: 36.57 KG/M2 | HEART RATE: 85 BPM | HEIGHT: 74 IN

## 2022-03-25 DIAGNOSIS — M75.51 CHRONIC BURSITIS OF RIGHT SHOULDER: ICD-10-CM

## 2022-03-25 DIAGNOSIS — G89.29 CHRONIC RIGHT SHOULDER PAIN: ICD-10-CM

## 2022-03-25 DIAGNOSIS — M75.31 CALCIFIC TENDINITIS OF RIGHT SHOULDER: Primary | ICD-10-CM

## 2022-03-25 DIAGNOSIS — M25.511 CHRONIC RIGHT SHOULDER PAIN: ICD-10-CM

## 2022-03-25 PROCEDURE — 99213 OFFICE O/P EST LOW 20 MIN: CPT | Performed by: SPECIALIST

## 2022-03-25 PROCEDURE — 20610 DRAIN/INJ JOINT/BURSA W/O US: CPT | Performed by: SPECIALIST

## 2022-03-25 RX ORDER — BETAMETHASONE SODIUM PHOSPHATE AND BETAMETHASONE ACETATE 3; 3 MG/ML; MG/ML
3 INJECTION, SUSPENSION INTRA-ARTICULAR; INTRALESIONAL; INTRAMUSCULAR; SOFT TISSUE ONCE
Status: COMPLETED | OUTPATIENT
Start: 2022-03-25 | End: 2022-03-25

## 2022-03-25 RX ADMIN — BETAMETHASONE SODIUM PHOSPHATE AND BETAMETHASONE ACETATE 3 MG: 3; 3 INJECTION, SUSPENSION INTRA-ARTICULAR; INTRALESIONAL; INTRAMUSCULAR; SOFT TISSUE at 14:06

## 2022-03-25 NOTE — PATIENT INSTRUCTIONS
Rotator Cuff: Exercises  Introduction  Here are some examples of exercises for you to try. The exercises may be suggested for a condition or for rehabilitation. Start each exercise slowly. Ease off the exercises if you start to have pain. You will be told when to start these exercises and which ones will work best for you. How to do the exercises  Pendulum swing    If you have pain in your back, do not do this exercise. 1. Hold on to a table or the back of a chair with your good arm. Then bend forward a little and let your sore arm hang straight down. This exercise does not use the arm muscles. Rather, use your legs and your hips to create movement that makes your arm swing freely. 2. Use the movement from your hips and legs to guide the slightly swinging arm back and forth like a pendulum (or elephant trunk). Then guide it in circles that start small (about the size of a dinner plate). Make the circles a bit larger each day, as your pain allows. 3. Do this exercise for 5 minutes, 5 to 7 times each day. 4. As you have less pain, try bending over a little farther to do this exercise. This will increase the amount of movement at your shoulder. Posterior stretching exercise    1. Hold the elbow of your injured arm with your other hand. 2. Use your hand to pull your injured arm gently up and across your body. You will feel a gentle stretch across the back of your injured shoulder. 3. Hold for at least 15 to 30 seconds. Then slowly lower your arm. 4. Repeat 2 to 4 times. Up-the-back stretch    Your doctor or physical therapist may want you to wait to do this stretch until you have regained most of your range of motion and strength. You can do this stretch in different ways. Hold any of these stretches for at least 15 to 30 seconds. Repeat them 2 to 4 times. 1. Light stretch: Put your hand in your back pocket. Let it rest there to stretch your shoulder. 2. Moderate stretch:  With your other hand, hold your injured arm (palm outward) behind your back by the wrist. Pull your arm up gently to stretch your shoulder. 3. Advanced stretch: Put a towel over your other shoulder. Put the hand of your injured arm behind your back. Now hold the back end of the towel. With the other hand, hold the front end of the towel in front of your body. Pull gently on the front end of the towel. This will bring your hand farther up your back to stretch your shoulder. Overhead stretch    1. Standing about an arm's length away, grasp onto a solid surface. You could use a countertop, a doorknob, or the back of a sturdy chair. 2. With your knees slightly bent, bend forward with your arms straight. Lower your upper body, and let your shoulders stretch. 3. As your shoulders are able to stretch farther, you may need to take a step or two backward. 4. Hold for at least 15 to 30 seconds. Then stand up and relax. If you had stepped back during your stretch, step forward so you can keep your hands on the solid surface. 5. Repeat 2 to 4 times. Shoulder flexion (lying down)    To make a wand for this exercise, use a piece of PVC pipe or a broom handle with the broom removed. Make the wand about a foot wider than your shoulders. 1. Lie on your back, holding a wand with both hands. Your palms should face down as you hold the wand. 2. Keeping your elbows straight, slowly raise your arms over your head. Raise them until you feel a stretch in your shoulders, upper back, and chest.  3. Hold for 15 to 30 seconds. 4. Repeat 2 to 4 times. Shoulder rotation (lying down)    To make a wand for this exercise, use a piece of PVC pipe or a broom handle with the broom removed. Make the wand about a foot wider than your shoulders. 1. Lie on your back. Hold a wand with both hands with your elbows bent and palms up. 2. Keep your elbows close to your body, and move the wand across your body toward the sore arm. 3. Hold for 8 to 12 seconds.   4. Repeat 2 to 4 times. Wall climbing (to the side)    Avoid any movement that is straight to your side, and be careful not to arch your back. Your arm should stay about 30 degrees to the front of your side. 1. Stand with your side to a wall so that your fingers can just touch it at an angle about 30 degrees toward the front of your body. 2. Walk the fingers of your injured arm up the wall as high as pain permits. Try not to shrug your shoulder up toward your ear as you move your arm up. 3. Hold that position for a count of at least 15 to 20.  4. Walk your fingers back down to the starting position. 5. Repeat at least 2 to 4 times. Try to reach higher each time. Wall climbing (to the front)    During this stretching exercise, be careful not to arch your back. 1. Face a wall, and stand so your fingers can just touch it. 2. Keeping your shoulder down, walk the fingers of your injured arm up the wall as high as pain permits. (Don't shrug your shoulder up toward your ear.)  3. Hold your arm in that position for at least 15 to 30 seconds. 4. Slowly walk your fingers back down to where you started. 5. Repeat at least 2 to 4 times. Try to reach higher each time. Shoulder blade squeeze    1. Stand with your arms at your sides, and squeeze your shoulder blades together. Do not raise your shoulders up as you squeeze. 2. Hold 6 seconds. 3. Repeat 8 to 12 times. Scapular exercise: Arm reach    1. Lie flat on your back. This exercise is a very slight motion that starts with your arms raised (elbows straight, arms straight). 2. From this position, reach higher toward the more or ceiling. Keep your elbows straight. All motion should be from your shoulder blade only. 3. Relax your arms back to where you started. 4. Repeat 8 to 12 times. Arm raise to the side    During this strengthening exercise, your arm should stay about 30 degrees to the front of your side.   1. Slowly raise your injured arm to the side, with your thumb facing up. Raise your arm 60 degrees at the most (shoulder level is 90 degrees). 2. Hold the position for 3 to 5 seconds. Then lower your arm back to your side. If you need to, bring your \"good\" arm across your body and place it under the elbow as you lower your injured arm. Use your good arm to keep your injured arm from dropping down too fast.  3. Repeat 8 to 12 times. 4. When you first start out, don't hold any extra weight in your hand. As you get stronger, you may use a 1-pound to 2-pound dumbbell or a small can of food. Shoulder flexor and extensor exercise    These are isometric exercises. That means you contract your muscles without actually moving. 1. Push forward (flex): Stand facing a wall or doorjamb, about 6 inches or less back. Hold your injured arm against your body. Make a closed fist with your thumb on top. Then gently push your hand forward into the wall with about 25% to 50% of your strength. Don't let your body move backward as you push. Hold for about 6 seconds. Relax for a few seconds. Repeat 8 to 12 times. 2. Push backward (extend): Stand with your back flat against a wall. Your upper arm should be against the wall, with your elbow bent 90 degrees (your hand straight ahead). Push your elbow gently back against the wall with about 25% to 50% of your strength. Don't let your body move forward as you push. Hold for about 6 seconds. Relax for a few seconds. Repeat 8 to 12 times. Scapular exercise: Wall push-ups    This exercise is best done with your fingers somewhat turned out, rather than straight up and down. 1. Stand facing a wall, about 12 inches to 18 inches away. 2. Place your hands on the wall at shoulder height. 3. Slowly bend your elbows and bring your face to the wall. Keep your back and hips straight. 4. Push back to where you started. 5. Repeat 8 to 12 times. 6. When you can do this exercise against a wall comfortably, you can try it against a counter.  You can then slowly progress to the end of a couch, then to a sturdy chair, and finally to the floor. Scapular exercise: Retraction    For this exercise, you will need elastic exercise material, such as surgical tubing or Thera-Band. 1. Put the band around a solid object at about waist level. (A bedpost will work well.) Each hand should hold an end of the band. 2. With your elbows at your sides and bent to 90 degrees, pull the band back. Your shoulder blades should move toward each other. Then move your arms back where you started. 3. Repeat 8 to 12 times. 4. If you have good range of motion in your shoulders, try this exercise with your arms lifted out to the sides. Keep your elbows at a 90-degree angle. Raise the elastic band up to about shoulder level. Pull the band back to move your shoulder blades toward each other. Then move your arms back where you started. Internal rotator strengthening exercise    1. Start by tying a piece of elastic exercise material to a doorknob. You can use surgical tubing or Thera-Band. 2. Stand or sit with your shoulder relaxed and your elbow bent 90 degrees. Your upper arm should rest comfortably against your side. Squeeze a rolled towel between your elbow and your body for comfort. This will help keep your arm at your side. 3. Hold one end of the elastic band in the hand of the painful arm. 4. Slowly rotate your forearm toward your body until it touches your belly. Slowly move it back to where you started. 5. Keep your elbow and upper arm firmly tucked against the towel roll or at your side. 6. Repeat 8 to 12 times. External rotator strengthening exercise    1. Start by tying a piece of elastic exercise material to a doorknob. You can use surgical tubing or Thera-Band. (You may also hold one end of the band in each hand.)  2. Stand or sit with your shoulder relaxed and your elbow bent 90 degrees. Your upper arm should rest comfortably against your side.  Squeeze a rolled towel between your elbow and your body for comfort. This will help keep your arm at your side. 3. Hold one end of the elastic band with the hand of the painful arm. 4. Start with your forearm across your belly. Slowly rotate the forearm out away from your body. Keep your elbow and upper arm tucked against the towel roll or the side of your body until you begin to feel tightness in your shoulder. Slowly move your arm back to where you started. 5. Repeat 8 to 12 times. Follow-up care is a key part of your treatment and safety. Be sure to make and go to all appointments, and call your doctor if you are having problems. It's also a good idea to know your test results and keep a list of the medicines you take. Where can you learn more? Go to http://www.gray.com/  Enter J005 in the search box to learn more about \"Rotator Cuff: Exercises. \"  Current as of: July 1, 2021               Content Version: 13.2  © 2006-2022 [employer]. Care instructions adapted under license by [employer] (which disclaims liability or warranty for this information). If you have questions about a medical condition or this instruction, always ask your healthcare professional. [place] [address] any warranty or liability for your use of this information.

## 2022-03-25 NOTE — PROGRESS NOTES
Patient: Galileo Bryant                MRN: [id number]       SSN: [id number]  YOB: 1975        AGE: 48 y.o. SEX: male    PCP: Uli Mahoney NP  03/25/22    Chief Complaint   Patient presents with    Shoulder Pain     rt      HISTORY:  Galileo Bryant is a 48 y.o. male who is seen for right shoulder pain. He landed on his right shoulder when he slipped on ice on 1/24/22. He was seen at the [facility] ED on 1/30/22 where shoulder x-rays revealed no acute findings. He was prescribed Motrin and referred for orthopaedic evaluation. He previously sprained his shoulder throwing a baseball when he was a teenager. He has been experiencing increased right shoulder pain since his injury. He feels shoulder pain with overhead activities and at night. He is right handed. He was previously seen for left calf and left ankle pain. His left ankle and left calf started hurting while compensating for his left knee pain. He feels primarily medial and posterior ankle and calf pain. He was previously seen for left knee pain. He completed an Euflexxa series on 2/21/20. He is s/p left knee arthroscopy medial menisectomy on 1/11/17--doing well. He has been experiencing increased pain for the past 2 weeks. He has been experiencing giving away episodes. He feels more pain when applying pressure to his left knee. He sometimes uses a knee brace. 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  He used to work for the [employer] and [employer]. He notes that he does a lot of walking at work but his new job provides him with a golf cart. He lives in [facility] [place] with his mother, 57-year-old daughter, 17-year-old daughter, and 8year-old son. He played softball up until about 10 years ago. He reports that his weight is stable. His 25ear-old daughter played basketball for [employer]. She's now in nursing school. He gained 15 pounds recently. He weighs 285 pounds and is 6'2\" tall. Weight Metrics 3/25/2022 3/16/2020 2/21/2020 2/14/2020 2/7/2020 1/10/2020 8/16/2019   Weight 285 lb 285 lb 9.6 oz 279 lb 279 lb 6.4 oz 278 lb 280 lb 3.2 oz 266 lb   BMI 36.59 kg/m2 36.67 kg/m2 35.82 kg/m2 35.87 kg/m2 35.69 kg/m2 35.98 kg/m2 34.15 kg/m2     Patient Active Problem List   Diagnosis Code    Elevated troponin R77.8    HTN (hypertension) I10    Chest pain R07.9    VSD (ventricular septal defect) Q21.0    Severe obesity ([facility] [address] E66.01     REVIEW OF SYSTEMS: All Below are Negative except: See HPI   Constitutional: negative for fever, chills, and weight loss. Cardiovascular: negative for chest pain, claudication, leg swelling, SOB, MONTALVO   Gastrointestinal: Negative for pain, N/V/C/D, Blood in stool or urine, dysuria,  hematuria, incontinence, pelvic pain. Musculoskeletal: See HPI   Neurological: Negative for dizziness and weakness. Negative for headaches, Visual changes, confusion, seizures   Phychiatric/Behavioral: Negative for depression, memory loss, substance  abuse. Extremities: Negative for hair changes, rash, or skin lesion changes. Hematologic: Negative for bleeding problems, bruising, pallor or swollen lymph  nodes   Peripheral Vascular: No calf pain, no circulation deficits.     Social History     Socioeconomic History    Marital status:      Spouse name: Not on file    Number of children: Not on file    Years of education: Not on file    Highest education level: Not on file   Occupational History    Not on file   Tobacco Use    Smoking status: Never Smoker    Smokeless tobacco: Never Used   Substance and Sexual Activity    Alcohol use: No    Drug use: No    Sexual activity: Not on file   Other Topics Concern    Not on file   Social History Narrative    Not on file     Social Determinants of Health     Financial Resource Strain:     Difficulty of Paying Living Expenses: Not on file   Food Insecurity:     Worried About Running Out of Food in the Last Year: Not on file    Madi of Food in the Last Year: Not on file   Transportation Needs:     Lack of Transportation (Medical): Not on file    Lack of Transportation (Non-Medical): Not on file   Physical Activity:     Days of Exercise per Week: Not on file    Minutes of Exercise per Session: Not on file   Stress:     Feeling of Stress : Not on file   Social Connections:     Frequency of Communication with Friends and Family: Not on file    Frequency of Social Gatherings with Friends and Family: Not on file    Attends Latter-day Services: Not on file    Active Member of [address] [place] [address] or Organizations: Not on file    Attends Club or Organization Meetings: Not on file    Marital Status: Not on file   Intimate Partner Violence:     Fear of Current or Ex-Partner: Not on file    Emotionally Abused: Not on file    Physically Abused: Not on file    Sexually Abused: Not on file   Housing Stability:     Unable to Pay for Housing in the Last Year: Not on file    Number of [place] in the Last Year: Not on file    Unstable Housing in the Last Year: Not on file      No Known Allergies   Current Outpatient Medications   Medication Sig    diclofenac EC (VOLTAREN) 75 mg EC tablet Take 1 Tab by mouth two (2) times daily (with meals).  aspirin 81 mg chewable tablet Take 81 mg by mouth daily.  losartan (COZAAR) 100 mg tablet Take 100 mg by mouth daily.  HYDROCHLOROTHIAZIDE PO Take 25 mg by mouth daily.     amlodipine besylate (AMLODIPINE PO) Take 5 mg by mouth daily.  METHIMAZOLE PO Take 10 mg by mouth daily. No current facility-administered medications for this visit. PHYSICAL EXAMINATION:  Visit Vitals  Pulse 85   Temp 97.3 °F (36.3 °C) (Temporal)   Ht 6' 2\" (1.88 m)   Wt 285 lb (129.3 kg)   SpO2 97%   BMI 36.59 kg/m²     ORTHO EXAMINATION:    Examination Right shoulder Left shoulder   Skin Intact Intact   Effusion - -   Biceps deformity - -   Atrophy - -   AC joint tenderness - -   Acromial tenderness + +   Biceps tenderness - -   Forward flexion/Elevation  170   Active abduction  170   External rotation ROM 10 30   Internal rotation ROM 70 90   Apprehension - -   Impingement - -   Drop Arm Test - -   Neurovascular Intact Intact    mesomorphic    TIME OUT:  Chart reviewed for the following:   ILuc MD, have reviewed the History, Physical and updated the Allergic reactions for Billy Nascimento   TIME OUT performed immediately prior to start of procedure:  Titus Garrison MD, have performed the following reviews on Billy Shivers prior to the start of the procedure:          * Patient was identified by name and date of birth   * Agreement on procedure being performed was verified  * Risks and Benefits explained to the patient  * Procedure site verified and marked as necessary  * Patient was positioned for comfort  * Consent was obtained     Time: 1:57 PM     Date of procedure: 3/25/2022  Procedure performed by:  Luc Guadarrama MD  Mr. Thomas Mehta tolerated the procedure well with no complications. MRI LEFT KNEE WO CONT 11/9/16  IMPRESSION:   1. Horizontal tear of the entire medial meniscus with adjacent parameniscal cyst.  2. Horizontal tear of the posterior horn of the lateral meniscus. 3. Grade 2 sprain of the MCL.  -Small focus of grade 3 chondromalacia of the medial femoral cartilage. RADIOGRAPHS:  XR RIGHT SHOULDER 1/30/22 [facility] ED  IMPRESSION   1. No fracture or dislocation. 2. Rotator cuff calcific tendinosis. XR LEFT KNEE 1/10/20 [facility]  IMPRESSION:  Three views - No fractures, no effusion, mild medial joint space narrowing, + osteophytes present. Kellgren Davy grade 1, chondrocalcinosis     XR LULÚ KNEES 9/15/16  IMPRESSION:  No fractures, no effusion, moderate joint space narrowing, small lateral osteophytes present, flattening and squaring of the medial femoral condyle. IMPRESSION:      ICD-10-CM ICD-9-CM    1. Calcific tendinitis of right shoulder  M75.31 726.11 betamethasone (CELESTONE) injection 3 mg      DRAIN/INJECT LARGE JOINT/BURSA   2. Chronic bursitis of right shoulder  M75.51 726.10 betamethasone (CELESTONE) injection 3 mg      DRAIN/INJECT LARGE JOINT/BURSA   3. Chronic right shoulder pain  M25.511 719.41 betamethasone (CELESTONE) injection 3 mg    G89.29 338.29 DRAIN/INJECT LARGE JOINT/BURSA     PLAN:  Patient was provided with home shoulder exercises. After discussing treatment options, patient's right shoulder was injected with 4 cc Marcaine and 1/2 cc Celestone. We discussed the possibility of a right shoulder MR arthrogram at some point  in the future if pain continues. He will follow up as needed.      Scribed by MD Cookie Miller) as dictated by Sendy German MD

## 2022-03-30 ENCOUNTER — TRANSCRIBE ORDER (OUTPATIENT)
Dept: SCHEDULING | Age: 47
End: 2022-03-30

## 2022-03-30 DIAGNOSIS — E04.1 NONTOXIC UNINODULAR GOITER: Primary | ICD-10-CM

## 2022-04-13 ENCOUNTER — HOSPITAL ENCOUNTER (OUTPATIENT)
Dept: ULTRASOUND IMAGING | Age: 47
Discharge: HOME | End: 2022-04-13
Attending: NURSE PRACTITIONER
Payer: COMMERCIAL

## 2022-04-13 DIAGNOSIS — E04.1 NONTOXIC UNINODULAR GOITER: ICD-10-CM

## 2022-04-13 PROCEDURE — 76536 US EXAM OF HEAD AND NECK: CPT

## 2025-05-07 ENCOUNTER — OFFICE VISIT (OUTPATIENT)
Age: 50
End: 2025-05-07
Payer: COMMERCIAL

## 2025-05-07 VITALS
OXYGEN SATURATION: 99 % | BODY MASS INDEX: 34.14 KG/M2 | WEIGHT: 266 LBS | HEART RATE: 60 BPM | SYSTOLIC BLOOD PRESSURE: 116 MMHG | HEIGHT: 74 IN | DIASTOLIC BLOOD PRESSURE: 90 MMHG

## 2025-05-07 DIAGNOSIS — I10 HYPERTENSION, UNSPECIFIED TYPE: Primary | ICD-10-CM

## 2025-05-07 DIAGNOSIS — Q21.0 VSD (VENTRICULAR SEPTAL DEFECT): ICD-10-CM

## 2025-05-07 DIAGNOSIS — E08.8 DIABETES MELLITUS DUE TO UNDERLYING CONDITION WITH UNSPECIFIED COMPLICATIONS (HCC): ICD-10-CM

## 2025-05-07 DIAGNOSIS — E07.9 THYROID DISORDER: ICD-10-CM

## 2025-05-07 DIAGNOSIS — I10 PRIMARY HYPERTENSION: ICD-10-CM

## 2025-05-07 PROCEDURE — 99204 OFFICE O/P NEW MOD 45 MIN: CPT | Performed by: INTERNAL MEDICINE

## 2025-05-07 PROCEDURE — 3074F SYST BP LT 130 MM HG: CPT | Performed by: INTERNAL MEDICINE

## 2025-05-07 PROCEDURE — 93000 ELECTROCARDIOGRAM COMPLETE: CPT | Performed by: INTERNAL MEDICINE

## 2025-05-07 PROCEDURE — 3080F DIAST BP >= 90 MM HG: CPT | Performed by: INTERNAL MEDICINE

## 2025-05-07 RX ORDER — AMLODIPINE BESYLATE 5 MG/1
5 TABLET ORAL DAILY
COMMUNITY
Start: 2025-03-23

## 2025-05-07 RX ORDER — TIRZEPATIDE 7.5 MG/.5ML
INJECTION, SOLUTION SUBCUTANEOUS
COMMUNITY
Start: 2025-04-14

## 2025-05-07 RX ORDER — TOPIRAMATE 25 MG/1
25 TABLET, FILM COATED ORAL DAILY
COMMUNITY
Start: 2025-03-20

## 2025-05-07 RX ORDER — CARVEDILOL 12.5 MG/1
12.5 TABLET ORAL 2 TIMES DAILY
COMMUNITY
Start: 2025-02-28

## 2025-05-07 NOTE — PROGRESS NOTES
HPI:  49-year-old male here for history of VSD, recent chest pain. Back in 2019, he had a MAURO showing perimembranous restrictive VSD, conservatively treated. Recently in February, he had heard the news about the death of his sister. Ultimately, there was some lapse in medications and was seen in the ER with chest pain. Nuclear stress test was low risk and transthoracic echocardiogram showed EF of 45%. He is back on his medications. His last episode of chest pain was about 3 weeks ago. He was also diagnosed with diabetes and now on medication.     Impression:  Recent episode of chest pain acutely after finding out about the death of his sister. He was hospitalized briefly.  New cardiomyopathy with EF of 45% but low-risk stress test February 2025.   History of perimembranous VSD by MAURO back in 2019.  Sleep apnea.   Recent diagnosis of diabetes.   Thyroid disorder.   BMI of 34.   Hypertension.     Plan:   He presented with chest pain that was soon after the stress of finding out his sister had passed. This could be a stress-induced cardiomyopathy but his stress test is not showing obvious ischemia. He did have perimembranous VSD and drop in EF so I have recommended a MAURO this point to more closely evaluate his VSD. If he continues to have ongoing chest pain, moving to CTA or heart catheterization would be very reasonable given his risk factors. Risks, benefit and alternatives of the MAURO discussed. I will make arrangements.        Will discuss GDMT after MAURO based on EF.    Wt Readings from Last 3 Encounters:   05/07/25 120.7 kg (266 lb)   03/25/22 129.3 kg (285 lb)     Past Medical History:   Diagnosis Date    Glaucoma     Heart murmur     Hypertension        Current Outpatient Medications   Medication Sig Dispense Refill    carvedilol (COREG) 12.5 MG tablet Take 1 tablet by mouth 2 times daily      MOUNJARO 7.5 MG/0.5ML SOAJ pen INJECT 0.5 ML UNDER SKIN ONCE A WEEK      topiramate (TOPAMAX) 25 MG tablet Take 1

## 2025-05-07 NOTE — PATIENT INSTRUCTIONS
[facility]          Patient  EP Instructions    Patient’s Name:  Jules Potts    You are scheduled to have a MAURO on  06/02/2025, at 0730a.    Please check in at 0630a.    Please go to [facility] and park in the outpatient parking lot that is located around to the back of the hospital and enter through the [facility] [place].   Once you enter through the [facility] check in with the  there.  The  will either give you directions or assist you in getting to the cath holding area.          3.  You are not to eat or drink anything after midnight the night before your procedure.     Please continue to take your medications with a small sip of water on the morning of the procedure with the following exceptions:  none.      If you are diabetic, do not take your insulin/sugar pill the morning of the procedure.    We encourage families to wait in the waiting room on the first floor while the procedure is being done.  The Doctor will come out and talk with you as soon as the procedure is over.    There is the possibility that you may spend the night in the hospital, depending on the results of the procedure.  This will be determined after the procedure is done.     8.   If you or your family have any questions, please call our office Monday-Friday 9:00am         -4:30 pm , at [phone], and ask to speak to one of the nurses.        OBTAIN LABWORK ORDERED SEVEN (7)  DAYS PRIOR TO PROCEDURE

## 2025-05-27 NOTE — PROGRESS NOTES
Verified arrival time of 0645 for 0730 procedure on 6-2-25. Pre-procedure instructions verified including NPO after midnight and which meds to take and/or hold. All questions answered, patient verbalized understanding.

## 2025-05-28 ENCOUNTER — HOSPITAL ENCOUNTER (OUTPATIENT)
Facility: HOSPITAL | Age: 50
Discharge: HOME | End: 2025-05-31
Payer: COMMERCIAL

## 2025-05-28 DIAGNOSIS — Q21.0 VSD (VENTRICULAR SEPTAL DEFECT): ICD-10-CM

## 2025-05-28 LAB
ANION GAP SERPL CALC-SCNC: 11 MMOL/L (ref 3–18)
BUN SERPL-MCNC: 23 MG/DL (ref 6–23)
BUN/CREAT SERPL: 21 (ref 12–20)
CALCIUM SERPL-MCNC: 9.4 MG/DL (ref 8.5–10.1)
CHLORIDE SERPL-SCNC: 101 MMOL/L (ref 98–107)
CO2 SERPL-SCNC: 26 MMOL/L (ref 21–32)
CREAT SERPL-MCNC: 1.09 MG/DL (ref 0.6–1.3)
ERYTHROCYTE [DISTWIDTH] IN BLOOD BY AUTOMATED COUNT: 14.6 % (ref 11.6–14.5)
GLUCOSE SERPL-MCNC: 126 MG/DL (ref 74–108)
HCT VFR BLD AUTO: 40.7 % (ref 36–48)
HGB BLD-MCNC: 13.1 G/DL (ref 13–16)
INR PPP: 1 (ref 0.9–1.1)
MCH RBC QN AUTO: 25.8 PG (ref 24–34)
MCHC RBC AUTO-ENTMCNC: 32.2 G/DL (ref 31–37)
MCV RBC AUTO: 80.1 FL (ref 78–100)
NRBC # BLD: 0 K/UL (ref 0–0.01)
NRBC BLD-RTO: 0 PER 100 WBC
PLATELET # BLD AUTO: 198 K/UL (ref 135–420)
PMV BLD AUTO: 12 FL (ref 9.2–11.8)
POTASSIUM SERPL-SCNC: 3.9 MMOL/L (ref 3.5–5.5)
PROTHROMBIN TIME: 13.4 SEC (ref 11.9–14.9)
RBC # BLD AUTO: 5.08 M/UL (ref 4.35–5.65)
SODIUM SERPL-SCNC: 138 MMOL/L (ref 136–145)
WBC # BLD AUTO: 5.8 K/UL (ref 4.6–13.2)

## 2025-05-28 PROCEDURE — 85610 PROTHROMBIN TIME: CPT

## 2025-05-28 PROCEDURE — 36415 COLL VENOUS BLD VENIPUNCTURE: CPT

## 2025-05-28 PROCEDURE — 80048 BASIC METABOLIC PNL TOTAL CA: CPT

## 2025-05-28 PROCEDURE — 85027 COMPLETE CBC AUTOMATED: CPT

## 2025-05-31 NOTE — H&P
Plan MAURO to evaluate VSD.    HPI:  49-year-old male here for history of VSD, recent chest pain. Back in 2019, he had a MAURO showing perimembranous restrictive VSD, conservatively treated. Recently in February, he had heard the news about the death of his sister. Ultimately, there was some lapse in medications and was seen in the ER with chest pain. Nuclear stress test was low risk and transthoracic echocardiogram showed EF of 45%. He is back on his medications. His last episode of chest pain was about 3 weeks ago. He was also diagnosed with diabetes and now on medication.      Impression:  Recent episode of chest pain acutely after finding out about the death of his sister. He was hospitalized briefly.  New cardiomyopathy with EF of 45% but low-risk stress test February 2025.   History of perimembranous VSD by MAURO back in 2019.  Sleep apnea.   Recent diagnosis of diabetes.   Thyroid disorder.   BMI of 34.   Hypertension.      Plan:   He presented with chest pain that was soon after the stress of finding out his sister had passed. This could be a stress-induced cardiomyopathy but his stress test is not showing obvious ischemia. He did have perimembranous VSD and drop in EF so I have recommended a MAURO this point to more closely evaluate his VSD. If he continues to have ongoing chest pain, moving to CTA or heart catheterization would be very reasonable given his risk factors. Risks, benefit and alternatives of the MAURO discussed. I will make arrangements.          Will discuss GDMT after MAURO based on EF.         Wt Readings from Last 3 Encounters:   05/07/25 120.7 kg (266 lb)   03/25/22 129.3 kg (285 lb)      Past Medical History        Past Medical History:   Diagnosis Date    Glaucoma      Heart murmur      Hypertension              Current Facility-Administered Medications          Current Outpatient Medications   Medication Sig Dispense Refill    carvedilol (COREG) 12.5 MG tablet Take 1 tablet by mouth 2 times daily

## 2025-06-02 ENCOUNTER — ANESTHESIA (OUTPATIENT)
Facility: HOSPITAL | Age: 50
End: 2025-06-02
Payer: COMMERCIAL

## 2025-06-02 ENCOUNTER — HOSPITAL ENCOUNTER (OUTPATIENT)
Facility: HOSPITAL | Age: 50
Discharge: HOME | End: 2025-06-02
Attending: INTERNAL MEDICINE | Admitting: INTERNAL MEDICINE
Payer: COMMERCIAL

## 2025-06-02 ENCOUNTER — ANESTHESIA EVENT (OUTPATIENT)
Facility: HOSPITAL | Age: 50
End: 2025-06-02
Payer: COMMERCIAL

## 2025-06-02 VITALS
SYSTOLIC BLOOD PRESSURE: 142 MMHG | BODY MASS INDEX: 32.6 KG/M2 | HEART RATE: 49 BPM | WEIGHT: 254 LBS | HEIGHT: 74 IN | OXYGEN SATURATION: 99 % | DIASTOLIC BLOOD PRESSURE: 98 MMHG | RESPIRATION RATE: 15 BRPM

## 2025-06-02 DIAGNOSIS — Q21.0 VSD (VENTRICULAR SEPTAL DEFECT): ICD-10-CM

## 2025-06-02 DIAGNOSIS — I50.9 HEART FAILURE, UNSPECIFIED HF CHRONICITY, UNSPECIFIED HEART FAILURE TYPE (HCC): ICD-10-CM

## 2025-06-02 LAB
ECHO BSA: 2.45 M2
ECHO VSD PEAK GRADIENT: 97 MMHG
GLUCOSE BLD STRIP.AUTO-MCNC: 128 MG/DL (ref 70–110)
Lab: 0.4 CM

## 2025-06-02 PROCEDURE — 93312 ECHO TRANSESOPHAGEAL: CPT

## 2025-06-02 PROCEDURE — 7100000011 HC PHASE II RECOVERY - ADDTL 15 MIN

## 2025-06-02 PROCEDURE — 93325 DOPPLER ECHO COLOR FLOW MAPG: CPT | Performed by: INTERNAL MEDICINE

## 2025-06-02 PROCEDURE — 3700000001 HC ADD 15 MINUTES (ANESTHESIA)

## 2025-06-02 PROCEDURE — 6360000002 HC RX W HCPCS

## 2025-06-02 PROCEDURE — 93320 DOPPLER ECHO COMPLETE: CPT | Performed by: INTERNAL MEDICINE

## 2025-06-02 PROCEDURE — 82962 GLUCOSE BLOOD TEST: CPT

## 2025-06-02 PROCEDURE — 3700000000 HC ANESTHESIA ATTENDED CARE

## 2025-06-02 PROCEDURE — 93312 ECHO TRANSESOPHAGEAL: CPT | Performed by: INTERNAL MEDICINE

## 2025-06-02 PROCEDURE — 7100000010 HC PHASE II RECOVERY - FIRST 15 MIN

## 2025-06-02 RX ORDER — ACYCLOVIR 200 MG/1
10 CAPSULE ORAL PRN
Status: DISCONTINUED | OUTPATIENT
Start: 2025-06-02 | End: 2025-06-02 | Stop reason: HOSPADM

## 2025-06-02 RX ORDER — PROPOFOL 10 MG/ML
INJECTION, EMULSION INTRAVENOUS
Status: DISCONTINUED | OUTPATIENT
Start: 2025-06-02 | End: 2025-06-02 | Stop reason: SDUPTHER

## 2025-06-02 RX ORDER — SODIUM CHLORIDE 9 MG/ML
INJECTION, SOLUTION INTRAVENOUS PRN
Status: DISCONTINUED | OUTPATIENT
Start: 2025-06-02 | End: 2025-06-02 | Stop reason: HOSPADM

## 2025-06-02 RX ADMIN — PROPOFOL 50 MG: 10 INJECTION, EMULSION INTRAVENOUS at 07:40

## 2025-06-02 RX ADMIN — PROPOFOL 50 MG: 10 INJECTION, EMULSION INTRAVENOUS at 07:39

## 2025-06-02 RX ADMIN — PROPOFOL 50 MG: 10 INJECTION, EMULSION INTRAVENOUS at 07:42

## 2025-06-02 ASSESSMENT — ENCOUNTER SYMPTOMS: DYSPNEA ACTIVITY LEVEL: NO INTERVAL CHANGE

## 2025-06-02 NOTE — ANESTHESIA POSTPROCEDURE EVALUATION
Department of Anesthesiology  Postprocedure Note    Patient: Jules Potts  MRN: [id number]  YOB: 1975  Date of evaluation: 6/2/2025    Procedure Summary       Date: 06/02/25 Room / Location: [facility] CARDIAC CATH LAB; [facility] NON-INVASIVE CARDIOLOGY    Anesthesia Start: 0735 Anesthesia Stop: 0805    Procedure: MAURO (PRN CONTRAST/BUBBLE/3D) Diagnosis:       VSD (ventricular septal defect)      Heart failure, unspecified HF chronicity, unspecified heart failure type (HCC)      VSD (ventricular septal defect)      Heart failure, unspecified HF chronicity, unspecified heart failure type (HCC)    Scheduled Providers: Jeffery Salmeron MD Responsible Provider: Jared Henriquez MD    Anesthesia Type: MAC ASA Status: 3            Anesthesia Type: No value filed.    Danette Phase I: Danette Score: 10    Danette Phase II:      Anesthesia Post Evaluation    Patient location during evaluation: bedside  Patient participation: complete - patient participated  Level of consciousness: responsive to verbal stimuli  Airway patency: patent  Nausea & Vomiting: no nausea  Respiratory status: acceptable  Hydration status: euvolemic    No notable events documented.

## 2025-06-02 NOTE — DISCHARGE INSTRUCTIONS
No driving x 24 hours.        DISCHARGE SUMMARY from Nurse    PATIENT INSTRUCTIONS:    After general anesthesia or intravenous sedation, for 24 hours or while taking prescription Narcotics:  Limit your activities  Do not drive and operate hazardous machinery  Do not make important personal or business decisions  Do  not drink alcoholic beverages  If you have not urinated within 8 hours after discharge, please contact your surgeon on call.    Report the following to your surgeon:  Excessive pain, swelling, redness or odor of or around the surgical area  Temperature over 100.5  Nausea and vomiting lasting longer than 4 hours or if unable to take medications  Any signs of decreased circulation or nerve impairment to extremity: change in color, persistent  numbness, tingling, coldness or increase pain  Any questions    What to do at Home:  Recommended activity: activity as tolerated      *  Please give a list of your current medications to your Primary Care Provider.    *  Please update this list whenever your medications are discontinued, doses are      changed, or new medications (including over-the-counter products) are added.    *  Please carry medication information at all times in case of emergency situations.    These are general instructions for a healthy lifestyle:    No smoking/ No tobacco products/ Avoid exposure to second hand smoke  Surgeon General's Warning:  Quitting smoking now greatly reduces serious risk to your health.    Obesity, smoking, and sedentary lifestyle greatly increases your risk for illness    A healthy diet, regular physical exercise & weight monitoring are important for maintaining a healthy lifestyle    You may be retaining fluid if you have a history of heart failure or if you experience any of the following symptoms:  Weight gain of 3 pounds or more overnight or 5 pounds in a week, increased swelling in our hands or feet or shortness of breath while lying flat in bed.  Please call

## 2025-06-02 NOTE — PROGRESS NOTES
Cath holding summary:    0700: Patient ambulated from waiting area without difficulty, placed on monitor Sins ignacio. A&O x4, no c/o pain. NPO since midnight, ID and allergies verified. H&P reviewed, med rec completed. PIV x1 inserted.consent ready for signature.    0751- S/P PROCEDURE, PT WAKING UP    0753- PT AWAKE    Procedure: MAURO  Intervention: N/A  If yes, antiplatelet administered: N/A  Site: NA  Pain: 0      0900: AVS Discharge instructions reviewed with patient and copy given to patient.  All questions answered and all medications reviewed with the patient.  Patient verbalized understanding to all discharge instructions, including when to resume all medications prescribed.  PIV removed. Procedural site within normal limits.  No hematoma or bleeding noted from procedural and PIV site. No pain noted at discharge. Patient back to neurological baseline, A&O x4. Patient discharged in the presence of a responsible adult (CLAUDINE) who will accompany patient home and is able to report post procedure complications.

## 2025-06-02 NOTE — ANESTHESIA PRE PROCEDURE
Department of Anesthesiology  Preprocedure Note       Name:  Jules Potts   Age:  49 y.o.  :  1975                                          MRN:  [id number]         Date:  2025      Surgeon: * No surgeons listed *    Procedure: * No procedures listed *    Medications prior to admission:   Prior to Admission medications    Medication Sig Start Date End Date Taking? Authorizing Provider   carvedilol (COREG) 12.5 MG tablet Take 1 tablet by mouth 2 times daily 25  Yes Provider, Historical, MD   MOUNJARO 7.5 MG/0.5ML SOAJ pen INJECT 0.5 ML UNDER SKIN ONCE A WEEK 25  Yes Provider, MD Enrrique   topiramate (TOPAMAX) 25 MG tablet Take 1 tablet by mouth daily 3/20/25  Yes ProviderEnrrique MD   amLODIPine (NORVASC) 5 MG tablet Take 1 tablet by mouth daily 3/23/25  Yes ProviderEnrrique MD   HYDROCHLOROTHIAZIDE PO Take 25 mg by mouth daily   Yes Automatic Reconciliation, Ar   aspirin 81 MG chewable tablet Take 1 tablet by mouth daily   Yes Automatic Reconciliation, Ar   diclofenac (VOLTAREN) 75 MG EC tablet Take 1 tablet by mouth 2 times daily (with meals) 9/15/20  Yes Automatic Reconciliation, Ar   losartan (COZAAR) 100 MG tablet Take 1 tablet by mouth daily   Yes Automatic Reconciliation, Ar   METHIMAZOLE PO Take 10 mg by mouth daily  Patient not taking: Reported on 2025    Automatic Reconciliation, Ar       Current medications:    Current Facility-Administered Medications   Medication Dose Route Frequency Provider Last Rate Last Admin   • 0.9 % sodium chloride infusion   IntraVENous PRN Jeffery Salmeron MD       • sodium chloride bacteriostatic 0.9 % injection 10 mL  10 mL Injection PRN Jeffery Salmeron MD           Allergies:  No Known Allergies    Problem List:    Patient Active Problem List   Diagnosis Code   • Severe obesity (HCC) E66.01   • Elevated troponin R79.89   • HTN (hypertension) I10   • Chest pain R07.9   • VSD (ventricular septal defect) Q21.0       Past Medical

## 2025-06-03 DIAGNOSIS — Q21.0 VSD (VENTRICULAR SEPTAL DEFECT): Primary | ICD-10-CM

## 2025-06-30 ENCOUNTER — HOSPITAL ENCOUNTER (OUTPATIENT)
Age: 50
Discharge: HOME | End: 2025-07-03
Attending: INTERNAL MEDICINE
Payer: COMMERCIAL

## 2025-06-30 DIAGNOSIS — Q21.0 VSD (VENTRICULAR SEPTAL DEFECT): ICD-10-CM

## 2025-06-30 PROCEDURE — 6360000004 HC RX CONTRAST MEDICATION: Performed by: INTERNAL MEDICINE

## 2025-06-30 PROCEDURE — A9585 GADOBUTROL INJECTION: HCPCS | Performed by: INTERNAL MEDICINE

## 2025-06-30 PROCEDURE — 75561 CARDIAC MRI FOR MORPH W/DYE: CPT

## 2025-06-30 RX ORDER — GADOBUTROL 604.72 MG/ML
20 INJECTION INTRAVENOUS
Status: COMPLETED | OUTPATIENT
Start: 2025-06-30 | End: 2025-06-30

## 2025-06-30 RX ADMIN — GADOBUTROL 20 ML: 604.72 INJECTION INTRAVENOUS at 12:21

## 2025-07-10 ENCOUNTER — TELEPHONE (OUTPATIENT)
Age: 50
End: 2025-07-10

## 2025-07-10 NOTE — TELEPHONE ENCOUNTER
Called patient to let him know he still needs to come in office for follow up post testing.  Patient verbalized understanding.

## 2025-07-24 ENCOUNTER — OFFICE VISIT (OUTPATIENT)
Age: 50
End: 2025-07-24
Payer: COMMERCIAL

## 2025-07-24 VITALS
HEIGHT: 74 IN | WEIGHT: 262 LBS | SYSTOLIC BLOOD PRESSURE: 150 MMHG | HEART RATE: 70 BPM | DIASTOLIC BLOOD PRESSURE: 98 MMHG | OXYGEN SATURATION: 69 % | BODY MASS INDEX: 33.62 KG/M2

## 2025-07-24 DIAGNOSIS — E07.9 THYROID DISORDER: Primary | ICD-10-CM

## 2025-07-24 DIAGNOSIS — Q21.0 VSD (VENTRICULAR SEPTAL DEFECT): ICD-10-CM

## 2025-07-24 DIAGNOSIS — I10 HYPERTENSION, UNSPECIFIED TYPE: ICD-10-CM

## 2025-07-24 PROCEDURE — 99214 OFFICE O/P EST MOD 30 MIN: CPT | Performed by: INTERNAL MEDICINE

## 2025-07-24 PROCEDURE — 3077F SYST BP >= 140 MM HG: CPT | Performed by: INTERNAL MEDICINE

## 2025-07-24 PROCEDURE — 3080F DIAST BP >= 90 MM HG: CPT | Performed by: INTERNAL MEDICINE

## 2025-07-24 RX ORDER — SACUBITRIL AND VALSARTAN 24; 26 MG/1; MG/1
1 TABLET, FILM COATED ORAL 2 TIMES DAILY
Qty: 180 TABLET | Refills: 3 | Status: SHIPPED | OUTPATIENT
Start: 2025-07-24 | End: 2025-07-24

## 2025-07-24 RX ORDER — LATANOPROST 50 UG/ML
SOLUTION/ DROPS OPHTHALMIC
COMMUNITY
Start: 2025-06-02

## 2025-07-24 RX ORDER — TIRZEPATIDE 15 MG/.5ML
INJECTION, SOLUTION SUBCUTANEOUS
COMMUNITY
Start: 2025-07-22

## 2025-07-24 RX ORDER — ERGOCALCIFEROL 1.25 MG/1
CAPSULE, LIQUID FILLED ORAL
COMMUNITY
Start: 2025-07-06

## 2025-07-24 RX ORDER — SACUBITRIL AND VALSARTAN 24; 26 MG/1; MG/1
1 TABLET, FILM COATED ORAL 2 TIMES DAILY
Qty: 180 TABLET | Refills: 3 | Status: SHIPPED | OUTPATIENT
Start: 2025-07-24

## 2025-07-24 NOTE — PATIENT INSTRUCTIONS
Stop losartan  Start entresto 24/26mg twice daily  If unable to obtain entresto , continue losartan

## 2025-07-24 NOTE — PROGRESS NOTES
Well perfused    No significant edema.    Neurological:   Alert and oriented to person, place, time.      No focal neurological deficit visually.  Skin:   No obvious rash

## 2025-08-29 ENCOUNTER — OFFICE VISIT (OUTPATIENT)
Dept: CARDIOTHORACIC SURGERY | Age: 50
End: 2025-08-29
Payer: COMMERCIAL

## 2025-08-29 VITALS
OXYGEN SATURATION: 96 % | WEIGHT: 256.6 LBS | SYSTOLIC BLOOD PRESSURE: 121 MMHG | HEIGHT: 74 IN | DIASTOLIC BLOOD PRESSURE: 85 MMHG | BODY MASS INDEX: 32.93 KG/M2 | HEART RATE: 58 BPM

## 2025-08-29 DIAGNOSIS — I50.22 HEART FAILURE WITH MID-RANGE EJECTION FRACTION (HFMEF) (HCC): Primary | ICD-10-CM

## 2025-08-29 DIAGNOSIS — I10 PRIMARY HYPERTENSION: ICD-10-CM

## 2025-08-29 DIAGNOSIS — E11.9 TYPE 2 DIABETES MELLITUS WITHOUT COMPLICATION, WITHOUT LONG-TERM CURRENT USE OF INSULIN (HCC): ICD-10-CM

## 2025-08-29 PROBLEM — R07.9 CHEST PAIN: Status: RESOLVED | Noted: 2018-02-14 | Resolved: 2025-08-29

## 2025-08-29 PROBLEM — E66.01 SEVERE OBESITY (HCC): Status: RESOLVED | Noted: 2020-01-10 | Resolved: 2025-08-29

## 2025-08-29 PROBLEM — R79.89 ELEVATED TROPONIN: Status: RESOLVED | Noted: 2018-02-14 | Resolved: 2025-08-29

## 2025-08-29 PROCEDURE — 3074F SYST BP LT 130 MM HG: CPT

## 2025-08-29 PROCEDURE — 3079F DIAST BP 80-89 MM HG: CPT

## 2025-08-29 PROCEDURE — 99214 OFFICE O/P EST MOD 30 MIN: CPT

## 2025-08-29 RX ORDER — SACUBITRIL AND VALSARTAN 49; 51 MG/1; MG/1
1 TABLET ORAL 2 TIMES DAILY
Qty: 60 TABLET | Refills: 3 | Status: SHIPPED | OUTPATIENT
Start: 2025-08-29

## 2025-08-29 ASSESSMENT — PATIENT HEALTH QUESTIONNAIRE - PHQ9
SUM OF ALL RESPONSES TO PHQ QUESTIONS 1-9: 0
1. LITTLE INTEREST OR PLEASURE IN DOING THINGS: NOT AT ALL
2. FEELING DOWN, DEPRESSED OR HOPELESS: NOT AT ALL
SUM OF ALL RESPONSES TO PHQ QUESTIONS 1-9: 0

## (undated) DEVICE — PROCEDURE KIT FLUID MGMT 10 FR CUST MAINFOLD

## (undated) DEVICE — INTRODUCER SHTH 6FR CANN L11CM DIL TIP 35MM GRN TUNGSTEN

## (undated) DEVICE — PRESSURE MONITORING SET: Brand: TRUWAVE

## (undated) DEVICE — ANGIOGRAPHY KIT CUST VASC

## (undated) DEVICE — CATHETER DIAG AD L100CM DIA6FR STD JUDKINS L 4 POLYUR COR

## (undated) DEVICE — COVER US PRB W15XL120CM W/ GEL RUBBERBAND TAPE STRP FLD GEN

## (undated) DEVICE — Device

## (undated) DEVICE — PACK PROCEDURE SURG VASC CATH 161 MMC LF

## (undated) DEVICE — INTRODUCER SHTH 7FR CANN L11CM DIL TIP 35MM ORNG TUNGSTEN

## (undated) DEVICE — CATHETER ANGIO JR4 STD 0.038 IN 6 FRX100 CM SUPER TORQUE +

## (undated) DEVICE — SET FLD ADMIN 3 W STPCOCK FIX FEM L BOR 1IN